# Patient Record
Sex: FEMALE | Race: BLACK OR AFRICAN AMERICAN | Employment: PART TIME | URBAN - METROPOLITAN AREA
[De-identification: names, ages, dates, MRNs, and addresses within clinical notes are randomized per-mention and may not be internally consistent; named-entity substitution may affect disease eponyms.]

---

## 2017-02-16 PROBLEM — S39.012A LUMBAR STRAIN: Status: ACTIVE | Noted: 2017-02-16

## 2017-05-10 PROBLEM — G47.01 INSOMNIA DUE TO MEDICAL CONDITION: Status: ACTIVE | Noted: 2017-05-10

## 2017-05-10 PROBLEM — M51.16 INTERVERTEBRAL DISC DISORDERS WITH RADICULOPATHY, LUMBAR REGION: Status: ACTIVE | Noted: 2017-05-10

## 2018-04-18 ENCOUNTER — APPOINTMENT (OUTPATIENT)
Dept: GENERAL RADIOLOGY | Age: 33
End: 2018-04-18
Payer: COMMERCIAL

## 2018-04-18 ENCOUNTER — HOSPITAL ENCOUNTER (EMERGENCY)
Age: 33
Discharge: HOME | End: 2018-04-18
Attending: EMERGENCY MEDICINE
Payer: COMMERCIAL

## 2018-04-18 VITALS
WEIGHT: 119 LBS | HEART RATE: 90 BPM | BODY MASS INDEX: 23.99 KG/M2 | HEIGHT: 59 IN | SYSTOLIC BLOOD PRESSURE: 118 MMHG | RESPIRATION RATE: 16 BRPM | DIASTOLIC BLOOD PRESSURE: 80 MMHG | OXYGEN SATURATION: 99 % | TEMPERATURE: 98.5 F

## 2018-04-18 DIAGNOSIS — S63.601A SPRAIN OF RIGHT THUMB, UNSPECIFIED SITE OF FINGER, INITIAL ENCOUNTER: Primary | ICD-10-CM

## 2018-04-18 PROCEDURE — 73130 X-RAY EXAM OF HAND: CPT

## 2018-04-18 PROCEDURE — 99283 EMERGENCY DEPT VISIT LOW MDM: CPT

## 2018-04-18 ASSESSMENT — PAIN DESCRIPTION - PAIN TYPE
TYPE: ACUTE PAIN
TYPE: ACUTE PAIN

## 2018-04-18 ASSESSMENT — PAIN DESCRIPTION - ORIENTATION
ORIENTATION: RIGHT
ORIENTATION: RIGHT

## 2018-04-18 ASSESSMENT — PAIN DESCRIPTION - LOCATION
LOCATION: FINGER (COMMENT WHICH ONE)
LOCATION: HAND

## 2018-04-18 ASSESSMENT — PAIN SCALES - GENERAL
PAINLEVEL_OUTOF10: 10
PAINLEVEL_OUTOF10: 8

## 2018-04-18 ASSESSMENT — PAIN DESCRIPTION - DESCRIPTORS
DESCRIPTORS: ACHING;SORE;THROBBING
DESCRIPTORS: CONSTANT

## 2018-04-18 ASSESSMENT — PAIN DESCRIPTION - PROGRESSION: CLINICAL_PROGRESSION: GRADUALLY IMPROVING

## 2018-05-30 ENCOUNTER — HOSPITAL ENCOUNTER (EMERGENCY)
Age: 33
Discharge: HOME | End: 2018-05-30
Attending: EMERGENCY MEDICINE
Payer: COMMERCIAL

## 2018-05-30 VITALS
DIASTOLIC BLOOD PRESSURE: 92 MMHG | RESPIRATION RATE: 16 BRPM | HEIGHT: 59 IN | WEIGHT: 120 LBS | SYSTOLIC BLOOD PRESSURE: 126 MMHG | BODY MASS INDEX: 24.19 KG/M2 | OXYGEN SATURATION: 100 % | HEART RATE: 87 BPM | TEMPERATURE: 98.7 F

## 2018-05-30 DIAGNOSIS — G89.29 ACUTE EXACERBATION OF CHRONIC LOW BACK PAIN: Primary | ICD-10-CM

## 2018-05-30 DIAGNOSIS — M54.50 ACUTE EXACERBATION OF CHRONIC LOW BACK PAIN: Primary | ICD-10-CM

## 2018-05-30 PROCEDURE — 96372 THER/PROPH/DIAG INJ SC/IM: CPT

## 2018-05-30 PROCEDURE — 6370000000 HC RX 637 (ALT 250 FOR IP): Performed by: EMERGENCY MEDICINE

## 2018-05-30 PROCEDURE — 99282 EMERGENCY DEPT VISIT SF MDM: CPT

## 2018-05-30 PROCEDURE — 6360000002 HC RX W HCPCS: Performed by: EMERGENCY MEDICINE

## 2018-05-30 RX ORDER — TRAMADOL HYDROCHLORIDE 50 MG/1
50 TABLET ORAL EVERY 6 HOURS PRN
COMMUNITY
End: 2018-07-16 | Stop reason: SDUPTHER

## 2018-05-30 RX ORDER — DIAZEPAM 5 MG/1
10 TABLET ORAL ONCE
Status: COMPLETED | OUTPATIENT
Start: 2018-05-30 | End: 2018-05-30

## 2018-05-30 RX ORDER — KETOROLAC TROMETHAMINE 30 MG/ML
60 INJECTION, SOLUTION INTRAMUSCULAR; INTRAVENOUS ONCE
Status: COMPLETED | OUTPATIENT
Start: 2018-05-30 | End: 2018-05-30

## 2018-05-30 RX ADMIN — KETOROLAC TROMETHAMINE 60 MG: 30 INJECTION, SOLUTION INTRAMUSCULAR at 21:44

## 2018-05-30 RX ADMIN — DIAZEPAM 10 MG: 5 TABLET ORAL at 21:44

## 2018-05-30 ASSESSMENT — PAIN SCALES - GENERAL
PAINLEVEL_OUTOF10: 6
PAINLEVEL_OUTOF10: 10
PAINLEVEL_OUTOF10: 10

## 2018-05-30 ASSESSMENT — PAIN DESCRIPTION - PROGRESSION: CLINICAL_PROGRESSION: NOT CHANGED

## 2018-05-30 ASSESSMENT — PAIN DESCRIPTION - PAIN TYPE: TYPE: ACUTE PAIN

## 2018-05-30 ASSESSMENT — PAIN DESCRIPTION - DESCRIPTORS: DESCRIPTORS: CONSTANT;RADIATING;SHOOTING

## 2018-05-30 ASSESSMENT — PAIN DESCRIPTION - ONSET: ONSET: ON-GOING

## 2018-05-30 ASSESSMENT — PAIN DESCRIPTION - ORIENTATION: ORIENTATION: LOWER

## 2018-05-30 ASSESSMENT — PAIN DESCRIPTION - LOCATION: LOCATION: BACK

## 2018-06-16 ENCOUNTER — HOSPITAL ENCOUNTER (EMERGENCY)
Age: 33
Discharge: HOME | End: 2018-06-16
Attending: EMERGENCY MEDICINE
Payer: COMMERCIAL

## 2018-06-16 ENCOUNTER — APPOINTMENT (OUTPATIENT)
Dept: CT IMAGING | Age: 33
End: 2018-06-16
Payer: COMMERCIAL

## 2018-06-16 VITALS
OXYGEN SATURATION: 97 % | TEMPERATURE: 97.8 F | HEIGHT: 59 IN | SYSTOLIC BLOOD PRESSURE: 118 MMHG | HEART RATE: 72 BPM | WEIGHT: 119 LBS | RESPIRATION RATE: 16 BRPM | BODY MASS INDEX: 23.99 KG/M2 | DIASTOLIC BLOOD PRESSURE: 79 MMHG

## 2018-06-16 DIAGNOSIS — G43.009 MIGRAINE WITHOUT AURA AND WITHOUT STATUS MIGRAINOSUS, NOT INTRACTABLE: Primary | ICD-10-CM

## 2018-06-16 PROCEDURE — 6360000002 HC RX W HCPCS: Performed by: EMERGENCY MEDICINE

## 2018-06-16 PROCEDURE — 96375 TX/PRO/DX INJ NEW DRUG ADDON: CPT

## 2018-06-16 PROCEDURE — 2580000003 HC RX 258: Performed by: EMERGENCY MEDICINE

## 2018-06-16 PROCEDURE — 70486 CT MAXILLOFACIAL W/O DYE: CPT

## 2018-06-16 PROCEDURE — 99284 EMERGENCY DEPT VISIT MOD MDM: CPT

## 2018-06-16 PROCEDURE — 70450 CT HEAD/BRAIN W/O DYE: CPT

## 2018-06-16 PROCEDURE — 96374 THER/PROPH/DIAG INJ IV PUSH: CPT

## 2018-06-16 RX ORDER — 0.9 % SODIUM CHLORIDE 0.9 %
1000 INTRAVENOUS SOLUTION INTRAVENOUS ONCE
Status: COMPLETED | OUTPATIENT
Start: 2018-06-16 | End: 2018-06-16

## 2018-06-16 RX ORDER — HYDROCODONE BITARTRATE AND ACETAMINOPHEN 5; 325 MG/1; MG/1
1 TABLET ORAL EVERY 6 HOURS PRN
Qty: 6 TABLET | Refills: 0 | Status: SHIPPED | OUTPATIENT
Start: 2018-06-16 | End: 2018-06-18

## 2018-06-16 RX ORDER — LORATADINE 10 MG/1
10 TABLET ORAL DAILY
Qty: 10 TABLET | Refills: 0 | Status: SHIPPED | OUTPATIENT
Start: 2018-06-16 | End: 2018-06-26

## 2018-06-16 RX ORDER — FLUTICASONE PROPIONATE 50 MCG
1 SPRAY, SUSPENSION (ML) NASAL DAILY
Qty: 1 BOTTLE | Refills: 0 | Status: SHIPPED | OUTPATIENT
Start: 2018-06-16 | End: 2019-09-22 | Stop reason: ALTCHOICE

## 2018-06-16 RX ORDER — DIPHENHYDRAMINE HYDROCHLORIDE 50 MG/ML
12.5 INJECTION INTRAMUSCULAR; INTRAVENOUS ONCE
Status: COMPLETED | OUTPATIENT
Start: 2018-06-16 | End: 2018-06-16

## 2018-06-16 RX ORDER — METOCLOPRAMIDE HYDROCHLORIDE 5 MG/ML
10 INJECTION INTRAMUSCULAR; INTRAVENOUS ONCE
Status: COMPLETED | OUTPATIENT
Start: 2018-06-16 | End: 2018-06-16

## 2018-06-16 RX ORDER — KETOROLAC TROMETHAMINE 30 MG/ML
15 INJECTION, SOLUTION INTRAMUSCULAR; INTRAVENOUS ONCE
Status: COMPLETED | OUTPATIENT
Start: 2018-06-16 | End: 2018-06-16

## 2018-06-16 RX ADMIN — SODIUM CHLORIDE 1000 ML: 9 INJECTION, SOLUTION INTRAVENOUS at 13:54

## 2018-06-16 RX ADMIN — KETOROLAC TROMETHAMINE 15 MG: 30 INJECTION, SOLUTION INTRAMUSCULAR at 13:54

## 2018-06-16 RX ADMIN — METOCLOPRAMIDE 10 MG: 5 INJECTION, SOLUTION INTRAMUSCULAR; INTRAVENOUS at 13:54

## 2018-06-16 RX ADMIN — DIPHENHYDRAMINE HYDROCHLORIDE 12.5 MG: 50 INJECTION, SOLUTION INTRAMUSCULAR; INTRAVENOUS at 13:54

## 2018-06-16 ASSESSMENT — PAIN SCALES - GENERAL
PAINLEVEL_OUTOF10: 9
PAINLEVEL_OUTOF10: 9

## 2018-06-16 ASSESSMENT — PAIN DESCRIPTION - LOCATION: LOCATION: HEAD

## 2018-06-16 ASSESSMENT — PAIN DESCRIPTION - PAIN TYPE: TYPE: ACUTE PAIN

## 2018-06-16 ASSESSMENT — PAIN DESCRIPTION - DESCRIPTORS: DESCRIPTORS: ACHING

## 2018-07-10 ENCOUNTER — HOSPITAL ENCOUNTER (OUTPATIENT)
Dept: ULTRASOUND IMAGING | Age: 33
Discharge: HOME | End: 2018-07-12
Payer: COMMERCIAL

## 2018-07-10 DIAGNOSIS — N80.129 ENDOMETRIOMA: ICD-10-CM

## 2018-07-10 PROCEDURE — 76856 US EXAM PELVIC COMPLETE: CPT

## 2018-07-10 PROCEDURE — 76830 TRANSVAGINAL US NON-OB: CPT

## 2018-07-17 ENCOUNTER — HOSPITAL ENCOUNTER (OUTPATIENT)
Age: 33
Discharge: HOME | End: 2018-07-19
Payer: COMMERCIAL

## 2018-07-17 LAB
ALCOHOL URINE: NOT DETECTED MG/DL
AMPHETAMINE SCREEN, URINE: NOT DETECTED
BARBITURATE SCREEN URINE: NOT DETECTED
BENZODIAZEPINE SCREEN, URINE: NOT DETECTED
CANNABINOID SCREEN URINE: NOT DETECTED
COCAINE METABOLITE SCREEN URINE: NOT DETECTED
METHADONE SCREEN, URINE: NOT DETECTED
OPIATE SCREEN URINE: NOT DETECTED
PHENCYCLIDINE SCREEN URINE: NOT DETECTED
PROPOXYPHENE SCREEN: NOT DETECTED

## 2018-07-17 PROCEDURE — G0480 DRUG TEST DEF 1-7 CLASSES: HCPCS

## 2018-07-17 PROCEDURE — 80307 DRUG TEST PRSMV CHEM ANLYZR: CPT

## 2018-07-21 LAB
6AM URINE: <10 NG/ML
CODEINE, URINE: <20 NG/ML
HYDROCODONE, URINE: <20 NG/ML
HYDROMORPHONE, URINE: <20 NG/ML
MORPHINE URINE: <20 NG/ML
NORHYDROCODONE, URINE: <20 NG/ML
NOROXYCODONE, URINE: <20 NG/ML
NOROXYMORPHONE, URINE: <20 NG/ML
O-DESMETHYLTRAMADOL,UR: 321 NG/ML
OXYCODONE, URINE CONFIRMATION: <20 NG/ML
OXYMORPHONE, URINE: <20 NG/ML
TRAMADOL, URINE: 724 NG/ML

## 2018-09-20 ENCOUNTER — HOSPITAL ENCOUNTER (EMERGENCY)
Age: 33
Discharge: HOME | End: 2018-09-20
Payer: COMMERCIAL

## 2018-09-20 VITALS
SYSTOLIC BLOOD PRESSURE: 142 MMHG | HEART RATE: 91 BPM | RESPIRATION RATE: 16 BRPM | DIASTOLIC BLOOD PRESSURE: 100 MMHG | OXYGEN SATURATION: 100 % | BODY MASS INDEX: 24.39 KG/M2 | TEMPERATURE: 97.9 F | WEIGHT: 121 LBS | HEIGHT: 59 IN

## 2018-09-20 DIAGNOSIS — S39.012A BACK STRAIN, INITIAL ENCOUNTER: Primary | ICD-10-CM

## 2018-09-20 DIAGNOSIS — J01.90 ACUTE NON-RECURRENT SINUSITIS, UNSPECIFIED LOCATION: ICD-10-CM

## 2018-09-20 PROCEDURE — 96372 THER/PROPH/DIAG INJ SC/IM: CPT

## 2018-09-20 PROCEDURE — 6360000002 HC RX W HCPCS: Performed by: PHYSICIAN ASSISTANT

## 2018-09-20 PROCEDURE — 99282 EMERGENCY DEPT VISIT SF MDM: CPT

## 2018-09-20 RX ORDER — AZITHROMYCIN 250 MG/1
TABLET, FILM COATED ORAL
Qty: 6 TABLET | Refills: 0 | Status: SHIPPED | OUTPATIENT
Start: 2018-09-20 | End: 2018-09-30

## 2018-09-20 RX ORDER — CYCLOBENZAPRINE HCL 10 MG
10 TABLET ORAL 3 TIMES DAILY PRN
Qty: 30 TABLET | Refills: 0 | Status: SHIPPED | OUTPATIENT
Start: 2018-09-20 | End: 2018-09-30

## 2018-09-20 RX ORDER — LIDOCAINE 50 MG/G
1 PATCH TOPICAL EVERY 24 HOURS
Qty: 10 PATCH | Refills: 0 | Status: SHIPPED | OUTPATIENT
Start: 2018-09-20 | End: 2018-09-30

## 2018-09-20 RX ORDER — OXYMETAZOLINE HYDROCHLORIDE 0.05 G/100ML
2 SPRAY NASAL 2 TIMES DAILY
Qty: 1 BOTTLE | Refills: 0 | Status: SHIPPED | OUTPATIENT
Start: 2018-09-20 | End: 2018-09-23

## 2018-09-20 RX ORDER — TRIAMCINOLONE ACETONIDE 40 MG/ML
40 INJECTION, SUSPENSION INTRA-ARTICULAR; INTRAMUSCULAR ONCE
Status: COMPLETED | OUTPATIENT
Start: 2018-09-20 | End: 2018-09-20

## 2018-09-20 RX ORDER — MORPHINE SULFATE 2 MG/ML
4 INJECTION, SOLUTION INTRAMUSCULAR; INTRAVENOUS ONCE
Status: COMPLETED | OUTPATIENT
Start: 2018-09-20 | End: 2018-09-20

## 2018-09-20 RX ADMIN — MORPHINE SULFATE 4 MG: 2 INJECTION, SOLUTION INTRAMUSCULAR; INTRAVENOUS at 17:09

## 2018-09-20 RX ADMIN — TRIAMCINOLONE ACETONIDE 40 MG: 40 INJECTION, SUSPENSION INTRA-ARTICULAR; INTRAMUSCULAR at 17:09

## 2018-09-20 ASSESSMENT — PAIN SCALES - GENERAL
PAINLEVEL_OUTOF10: 10
PAINLEVEL_OUTOF10: 10

## 2018-09-20 ASSESSMENT — PAIN DESCRIPTION - LOCATION: LOCATION: BACK

## 2018-09-20 ASSESSMENT — PAIN DESCRIPTION - ORIENTATION: ORIENTATION: LOWER;MID

## 2018-09-20 ASSESSMENT — PAIN DESCRIPTION - FREQUENCY: FREQUENCY: CONTINUOUS

## 2018-09-20 ASSESSMENT — PAIN DESCRIPTION - PAIN TYPE: TYPE: ACUTE PAIN

## 2018-09-20 ASSESSMENT — PAIN DESCRIPTION - DESCRIPTORS: DESCRIPTORS: SHARP;STABBING

## 2018-09-20 NOTE — ED PROVIDER NOTES
includes High Blood Pressure in her mother. Allergies: Aspirin and Nsaids    Physical Exam   VS:  BP (!) 142/100   Pulse 91   Temp 97.9 °F (36.6 °C) (Oral)   Resp 16   Ht 4' 11\" (1.499 m)   Wt 121 lb (54.9 kg)   LMP 05/11/2013   SpO2 100%   BMI 24.44 kg/m²      Oxygen Saturation Interpretation: Normal.    Constitutional:  Alert and oriented x4, development consistent with age, NAD  HEENT:  NC/NT. No bruising or lacerations, PERRLA, EOMI, sinus tenderness, Airway patent. Neck:  Normal ROM. Supple. No meningeal signs   Respiratory:  Clear to auscultation and breath sounds equal.  CV:  Regular rate and rhythm, without pathological murmurs, ectopy, gallops, or rubs. GI:  Abdomen soft, nontender, non-distended, No firm or pulsatile mass. Back: lower lumbar spine bilateral.             Tenderness: Moderate. Swelling: no.              Range of Motion: diminished range with pain. Skin:  no erythema, rash or swelling noted. Distal Function:              Motor deficit: none. Sensory deficit: none. Pulse deficit: none. Extremities: Full ROM x4,  No edema or tenderness   Straight leg raising: Positive bilaterally   Integument:  Normal turgor. Warm, dry, without visible rash. Neurological: CN II-XII grossly intact, Motor functions intact    Lab / Imaging Results   (All laboratory and radiology results have been personally reviewed by myself)  Labs:  No results found for this visit on 09/20/18. Imaging: All Radiology results interpreted by Radiologist unless otherwise noted. No orders to display     ED Course / Medical Decision Making     Medications   morphine (PF) injection 4 mg (4 mg Intramuscular Given 9/20/18 1709)   triamcinolone acetonide (KENALOG-40) injection 40 mg (40 mg Intramuscular Given 9/20/18 1709)     Re-examination:  Patients symptoms are improving after medication.  She is resting comfortably in the chair with no distress at this software. Every effort was made to ensure accuracy; however, inadvertent computerized transcription errors may be present.     END OF ED PROVIDER NOTE        Morales Hendrickson PA-C  09/20/18 0553

## 2018-11-26 ENCOUNTER — HOSPITAL ENCOUNTER (EMERGENCY)
Age: 33
Discharge: HOME | End: 2018-11-26
Payer: COMMERCIAL

## 2018-11-26 ENCOUNTER — APPOINTMENT (OUTPATIENT)
Dept: GENERAL RADIOLOGY | Age: 33
End: 2018-11-26
Payer: COMMERCIAL

## 2018-11-26 VITALS
OXYGEN SATURATION: 98 % | BODY MASS INDEX: 25 KG/M2 | WEIGHT: 124 LBS | TEMPERATURE: 98.8 F | HEART RATE: 99 BPM | SYSTOLIC BLOOD PRESSURE: 135 MMHG | HEIGHT: 59 IN | DIASTOLIC BLOOD PRESSURE: 88 MMHG | RESPIRATION RATE: 16 BRPM

## 2018-11-26 DIAGNOSIS — R05.9 COUGH: ICD-10-CM

## 2018-11-26 DIAGNOSIS — J02.9 ACUTE PHARYNGITIS, UNSPECIFIED ETIOLOGY: Primary | ICD-10-CM

## 2018-11-26 DIAGNOSIS — J40 BRONCHITIS: ICD-10-CM

## 2018-11-26 LAB — STREP GRP A PCR: NEGATIVE

## 2018-11-26 PROCEDURE — 87880 STREP A ASSAY W/OPTIC: CPT

## 2018-11-26 PROCEDURE — 71046 X-RAY EXAM CHEST 2 VIEWS: CPT

## 2018-11-26 PROCEDURE — 99283 EMERGENCY DEPT VISIT LOW MDM: CPT

## 2018-11-26 PROCEDURE — 6370000000 HC RX 637 (ALT 250 FOR IP): Performed by: PHYSICIAN ASSISTANT

## 2018-11-26 RX ORDER — CODEINE PHOSPHATE AND GUAIFENESIN 10; 100 MG/5ML; MG/5ML
10 SOLUTION ORAL ONCE
Status: COMPLETED | OUTPATIENT
Start: 2018-11-26 | End: 2018-11-26

## 2018-11-26 RX ORDER — METHYLPREDNISOLONE 4 MG/1
TABLET ORAL
Qty: 21 TABLET | Status: SHIPPED | OUTPATIENT
Start: 2018-11-26 | End: 2018-12-02

## 2018-11-26 RX ORDER — BENZONATATE 100 MG/1
100 CAPSULE ORAL 3 TIMES DAILY PRN
Qty: 21 CAPSULE | Refills: 0 | Status: SHIPPED | OUTPATIENT
Start: 2018-11-26 | End: 2018-12-03

## 2018-11-26 RX ADMIN — GUAIFENESIN AND CODEINE PHOSPHATE 10 ML: 10; 100 LIQUID ORAL at 15:39

## 2018-11-26 ASSESSMENT — PAIN SCALES - GENERAL: PAINLEVEL_OUTOF10: 10

## 2018-11-26 ASSESSMENT — PAIN DESCRIPTION - ORIENTATION: ORIENTATION: LEFT;RIGHT

## 2018-11-26 ASSESSMENT — PAIN DESCRIPTION - LOCATION: LOCATION: EAR;THROAT

## 2018-11-26 ASSESSMENT — PAIN DESCRIPTION - PAIN TYPE: TYPE: ACUTE PAIN

## 2018-11-26 ASSESSMENT — PAIN DESCRIPTION - DESCRIPTORS: DESCRIPTORS: SORE

## 2018-11-26 NOTE — ED PROVIDER NOTES
[facility]     Department of Emergency Medicine   ED  Provider Note  Admit Date/RoomTime: 11/26/2018  3:11 PM  ED Room: 03/03  Chief Complaint:   Pharyngitis (4 days, ears hurt, headache, no fever)    History of Present Illness   Source of history provided by:  patient. History/Exam Limitations: none. Amy Vicente is a 26 y.o. old female with a past medical history of:   Past Medical History:   Diagnosis Date    Abnormal Pap smear     Back pain     Bronchitis     Chronic back pain     Endometriosis     H/O colposcopy with cervical biopsy     Headache     Hypertension     Placenta previa     Current pregnancy    Pregnancy induced hypertension     Thyroid disease     warm nodule & goiter    presents to the emergency department by private vehicle, for cough, sinus pressure, sore throat, which began 3 days prior to arrival.  Since onset the symptoms have been persistent and mild in severity. The symptoms are associated with nonproductive cough, nasal congestion, sore throat, chills. There has been NO Documented fever, nausea, vomiting, abdominal pain, pain with range of motion of neck. Patient's children are both sick. Patient states she came in contact with people that had mono the other day. Patient does not play any sports. ROS   Pertinent positives and negatives are stated within HPI, all other systems reviewed and are negative. Past Surgical History:  has a past surgical history that includes Ovary removal; [place] tooth extraction; Tubal ligation (2013); Hysterectomy (2013); and hernia repair. Social History:  reports that she quit smoking about 2 years ago. Her smoking use included Cigarettes. She smoked 0.00 packs per day. She has never used smokeless tobacco. She reports that she does not drink alcohol or use drugs. Family History: family history includes High Blood Pressure in her mother.    Allergies: Aspirin and Nsaids    Physical Exam           ED Triage Vitals [11/26/18 0196]

## 2019-06-20 ENCOUNTER — HOSPITAL ENCOUNTER (EMERGENCY)
Age: 34
Discharge: HOME | End: 2019-06-20
Payer: COMMERCIAL

## 2019-06-20 VITALS
OXYGEN SATURATION: 98 % | SYSTOLIC BLOOD PRESSURE: 168 MMHG | TEMPERATURE: 98.9 F | DIASTOLIC BLOOD PRESSURE: 109 MMHG | HEART RATE: 78 BPM | BODY MASS INDEX: 27.47 KG/M2 | WEIGHT: 136 LBS | RESPIRATION RATE: 16 BRPM

## 2019-06-20 DIAGNOSIS — H66.002 NON-RECURRENT ACUTE SUPPURATIVE OTITIS MEDIA OF LEFT EAR WITHOUT SPONTANEOUS RUPTURE OF TYMPANIC MEMBRANE: Primary | ICD-10-CM

## 2019-06-20 DIAGNOSIS — J06.9 UPPER RESPIRATORY TRACT INFECTION, UNSPECIFIED TYPE: ICD-10-CM

## 2019-06-20 LAB
MONO TEST: NEGATIVE
STREP GRP A PCR: NEGATIVE

## 2019-06-20 PROCEDURE — 86308 HETEROPHILE ANTIBODY SCREEN: CPT

## 2019-06-20 PROCEDURE — 99282 EMERGENCY DEPT VISIT SF MDM: CPT

## 2019-06-20 PROCEDURE — 87880 STREP A ASSAY W/OPTIC: CPT

## 2019-06-20 RX ORDER — AMOXICILLIN 500 MG/1
500 CAPSULE ORAL 3 TIMES DAILY
Qty: 30 CAPSULE | Refills: 0 | Status: SHIPPED | OUTPATIENT
Start: 2019-06-20 | End: 2019-06-30

## 2019-06-20 ASSESSMENT — PAIN DESCRIPTION - PAIN TYPE: TYPE: ACUTE PAIN

## 2019-06-20 ASSESSMENT — PAIN DESCRIPTION - DESCRIPTORS: DESCRIPTORS: SORE

## 2019-06-20 ASSESSMENT — PAIN SCALES - GENERAL: PAINLEVEL_OUTOF10: 7

## 2019-06-20 ASSESSMENT — PAIN DESCRIPTION - LOCATION: LOCATION: THROAT

## 2019-06-20 NOTE — ED PROVIDER NOTES
[facility]        HPI:  6/20/19,   Time: 11:30 AM         Heather Man is a 37 y.o. female presenting to the ED for pain in throat, left ear pain and fatigue, beginning 3 days ago. The complaint has been persistent, moderate in severity, and worsened by nothing. The patient states she has not felt well for about a week. She states she is just been a little achy and tired. She had some diarrhea and nausea but no vomiting. Denies any abdominal pain. She states that she then started having some pain and discomfort the front of her neck and in her left ear. She states that it hurts when she swallows as well. She has had no fever or chills        ROS:     Constitutional:  See HPI  HENT: See HPI  Eyes: Negative for pain, discharge and redness  Respiratory:  Negative for shortness of breath, cough and wheezing  Cardiovascular: Negative for CP, edema or palpitations  Gastrointestinal: See HPI  Genitourinary: Negative for dysuria and frequency  Musculoskeletal: Negative for back pain and arthralgia  Skin: Negative for rash and wound  Neurological: Negative for weakness and headaches  Hematological: Negative for adenopathy    All other systems reviewed and are negative      -------------------------------- PAST HISTORY ----------------------------------  Past Medical History:  has a past medical history of Abnormal Pap smear, Back pain, Bronchitis, Chronic back pain, Endometriosis, H/O colposcopy with cervical biopsy, Headache, Hypertension, Placenta previa, Pregnancy induced hypertension, and Thyroid disease. Past Surgical History:  has a past surgical history that includes Ovary removal; [place] tooth extraction; Tubal ligation (2013); Hysterectomy (2013); and hernia repair. Social History:  reports that she quit smoking about 3 years ago. Her smoking use included cigarettes. She smoked 0.00 packs per day.  She has never used smokeless tobacco. She reports that she does not drink alcohol or use drugs. Family History: family history includes High Blood Pressure in her mother. The patients home medications have been reviewed. Allergies: Aspirin and Nsaids    --------------------------------- RESULTS ------------------------------------------  All laboratory and radiology results have been personally reviewed by myself   LABS:  Results for orders placed or performed during the hospital encounter of 06/20/19   Strep Screen Group A Throat   Result Value Ref Range    Strep Grp A PCR Negative Negative   Mononucleosis Screen   Result Value Ref Range    Mono Test Negative Negative       RADIOLOGY:  Interpreted by Radiologist.  No orders to display       ----------------- NURSING NOTES AND VITALS REVIEWED ---------------   The nursing notes within the ED encounter and vital signs as below have been reviewed. BP (!) 168/109   Pulse 78   Temp 98.9 °F (37.2 °C)   Resp 16   Wt 136 lb (61.7 kg)   LMP 05/11/2013   SpO2 98%   BMI 27.47 kg/m²   Oxygen Saturation Interpretation: Normal      --------------------------------PHYSICAL EXAM------------------------------------      Constitutional/General: Alert and oriented x3, well appearing, non toxic in NAD  Head: NC/AT  Eyes: PERRL, EOMI  TM's intact bilaterally. Left TM bulging. Tender left anterior cervical nodes. Mouth: Oropharynx clear, handling secretions, no trismus  Neck: Supple, full ROM, no meningeal signs  Pulmonary: Lungs clear to auscultation bilaterally, no wheezes, rales, or rhonchi. Not in respiratory distress  Cardiovascular:  Regular rate and rhythm, no murmurs, gallops, or rubs. 2+ distal pulses  Abdomen: Soft, + BS. No distension. Nontender. No palpable rigidity, rebound or guarding  Extremities: Moves all extremities x 4. Warm and well perfused  Skin: warm and dry without rash  Neurologic: GCS 15,  Intact.   No focal deficits  Psych: Normal Affect      ------------------------ ED COURSE/MEDICAL DECISION MAKING----------------------  Medications - No data to display      Medical Decision Making:    Rapid strep and mono negative. She does hae fluid in left ear with swollen lymph node   She does have nodule on her thyroid that is being worked up outpatient. Advised to call PCP for follow-up. Tylenol PRN       Counseling: The emergency provider has spoken with the patient and discussed todays results, in addition to providing specific details for the plan of care and counseling regarding the diagnosis and prognosis. Questions are answered at this time and they are agreeable with the plan.      ------------------------ IMPRESSION AND DISPOSITION -------------------------------    IMPRESSION  No diagnosis found.     DISPOSITION  Disposition: Discharge to home  Patient condition is stable                   Kadie Etienne PA-C  06/20/19 5811

## 2019-09-22 ENCOUNTER — HOSPITAL ENCOUNTER (EMERGENCY)
Age: 34
Discharge: HOME | End: 2019-09-22
Attending: EMERGENCY MEDICINE
Payer: COMMERCIAL

## 2019-09-22 ENCOUNTER — APPOINTMENT (OUTPATIENT)
Dept: GENERAL RADIOLOGY | Age: 34
End: 2019-09-22
Payer: COMMERCIAL

## 2019-09-22 VITALS
WEIGHT: 145 LBS | OXYGEN SATURATION: 98 % | HEIGHT: 59 IN | TEMPERATURE: 99.1 F | HEART RATE: 90 BPM | BODY MASS INDEX: 29.23 KG/M2 | DIASTOLIC BLOOD PRESSURE: 88 MMHG | SYSTOLIC BLOOD PRESSURE: 128 MMHG | RESPIRATION RATE: 16 BRPM

## 2019-09-22 DIAGNOSIS — J01.00 ACUTE MAXILLARY SINUSITIS, RECURRENCE NOT SPECIFIED: Primary | ICD-10-CM

## 2019-09-22 PROCEDURE — 96372 THER/PROPH/DIAG INJ SC/IM: CPT

## 2019-09-22 PROCEDURE — 6360000002 HC RX W HCPCS: Performed by: EMERGENCY MEDICINE

## 2019-09-22 PROCEDURE — 99283 EMERGENCY DEPT VISIT LOW MDM: CPT

## 2019-09-22 PROCEDURE — 71046 X-RAY EXAM CHEST 2 VIEWS: CPT

## 2019-09-22 PROCEDURE — 6370000000 HC RX 637 (ALT 250 FOR IP): Performed by: EMERGENCY MEDICINE

## 2019-09-22 RX ORDER — METOPROLOL SUCCINATE 25 MG/1
25 TABLET, EXTENDED RELEASE ORAL DAILY
COMMUNITY

## 2019-09-22 RX ORDER — DOXYCYCLINE HYCLATE 100 MG
100 TABLET ORAL 2 TIMES DAILY
Qty: 20 TABLET | Refills: 0 | Status: SHIPPED | OUTPATIENT
Start: 2019-09-22 | End: 2019-10-02

## 2019-09-22 RX ORDER — PREDNISONE 10 MG/1
40 TABLET ORAL DAILY
Qty: 20 TABLET | Refills: 0 | Status: SHIPPED | OUTPATIENT
Start: 2019-09-22 | End: 2019-09-27

## 2019-09-22 RX ORDER — GUAIFENESIN/DEXTROMETHORPHAN 100-10MG/5
10 SYRUP ORAL ONCE
Status: COMPLETED | OUTPATIENT
Start: 2019-09-22 | End: 2019-09-22

## 2019-09-22 RX ORDER — ALBUTEROL SULFATE 90 UG/1
2 AEROSOL, METERED RESPIRATORY (INHALATION) EVERY 4 HOURS PRN
Qty: 1 INHALER | Status: SHIPPED | OUTPATIENT
Start: 2019-09-22 | End: 2020-10-08

## 2019-09-22 RX ORDER — ACETAMINOPHEN 500 MG
1000 TABLET ORAL ONCE
Status: COMPLETED | OUTPATIENT
Start: 2019-09-22 | End: 2019-09-22

## 2019-09-22 RX ORDER — LORATADINE AND PSEUDOEPHEDRINE SULFATE 5; 120 MG/1; MG/1
1 TABLET, EXTENDED RELEASE ORAL 2 TIMES DAILY
Qty: 20 TABLET | Refills: 0 | Status: SHIPPED | OUTPATIENT
Start: 2019-09-22 | End: 2020-10-08

## 2019-09-22 RX ORDER — CEFTRIAXONE 1 G/1
1 INJECTION, POWDER, FOR SOLUTION INTRAMUSCULAR; INTRAVENOUS ONCE
Status: COMPLETED | OUTPATIENT
Start: 2019-09-22 | End: 2019-09-22

## 2019-09-22 RX ADMIN — ACETAMINOPHEN 1000 MG: 500 TABLET ORAL at 03:57

## 2019-09-22 RX ADMIN — CEFTRIAXONE SODIUM 1 G: 1 INJECTION, POWDER, FOR SOLUTION INTRAMUSCULAR; INTRAVENOUS at 03:56

## 2019-09-22 RX ADMIN — GUAIFENESIN AND DEXTROMETHORPHAN 10 ML: 100; 10 SYRUP ORAL at 03:57

## 2019-09-22 ASSESSMENT — PAIN DESCRIPTION - ONSET: ONSET: ON-GOING

## 2019-09-22 ASSESSMENT — PAIN DESCRIPTION - PAIN TYPE: TYPE: ACUTE PAIN

## 2019-09-22 ASSESSMENT — PAIN DESCRIPTION - FREQUENCY: FREQUENCY: CONTINUOUS

## 2019-09-22 ASSESSMENT — PAIN DESCRIPTION - LOCATION: LOCATION: GENERALIZED

## 2019-09-22 ASSESSMENT — PAIN DESCRIPTION - PROGRESSION: CLINICAL_PROGRESSION: GRADUALLY WORSENING

## 2019-09-22 ASSESSMENT — PAIN DESCRIPTION - DESCRIPTORS: DESCRIPTORS: ACHING

## 2019-09-22 ASSESSMENT — PAIN SCALES - GENERAL
PAINLEVEL_OUTOF10: 5
PAINLEVEL_OUTOF10: 5

## 2019-12-23 ENCOUNTER — HOSPITAL ENCOUNTER (EMERGENCY)
Age: 34
Discharge: HOME | End: 2019-12-23
Attending: EMERGENCY MEDICINE
Payer: COMMERCIAL

## 2019-12-23 VITALS
HEIGHT: 59 IN | OXYGEN SATURATION: 99 % | TEMPERATURE: 98.5 F | DIASTOLIC BLOOD PRESSURE: 78 MMHG | BODY MASS INDEX: 29.84 KG/M2 | WEIGHT: 148 LBS | SYSTOLIC BLOOD PRESSURE: 124 MMHG | RESPIRATION RATE: 16 BRPM | HEART RATE: 84 BPM

## 2019-12-23 DIAGNOSIS — S39.012A BACK STRAIN, INITIAL ENCOUNTER: Primary | ICD-10-CM

## 2019-12-23 PROCEDURE — 99282 EMERGENCY DEPT VISIT SF MDM: CPT

## 2019-12-23 PROCEDURE — 6360000002 HC RX W HCPCS: Performed by: EMERGENCY MEDICINE

## 2019-12-23 PROCEDURE — 96372 THER/PROPH/DIAG INJ SC/IM: CPT

## 2019-12-23 RX ORDER — ORPHENADRINE CITRATE 30 MG/ML
60 INJECTION INTRAMUSCULAR; INTRAVENOUS ONCE
Status: COMPLETED | OUTPATIENT
Start: 2019-12-23 | End: 2019-12-23

## 2019-12-23 RX ORDER — KETOROLAC TROMETHAMINE 10 MG/1
10 TABLET, FILM COATED ORAL EVERY 6 HOURS PRN
Qty: 20 TABLET | Refills: 0 | Status: SHIPPED | OUTPATIENT
Start: 2019-12-23 | End: 2020-10-08

## 2019-12-23 RX ORDER — CYCLOBENZAPRINE HCL 10 MG
10 TABLET ORAL 3 TIMES DAILY PRN
Qty: 15 TABLET | Refills: 0 | Status: SHIPPED | OUTPATIENT
Start: 2019-12-23 | End: 2019-12-28

## 2019-12-23 RX ORDER — KETOROLAC TROMETHAMINE 30 MG/ML
30 INJECTION, SOLUTION INTRAMUSCULAR; INTRAVENOUS ONCE
Status: COMPLETED | OUTPATIENT
Start: 2019-12-23 | End: 2019-12-23

## 2019-12-23 RX ADMIN — KETOROLAC TROMETHAMINE 30 MG: 30 INJECTION, SOLUTION INTRAMUSCULAR; INTRAVENOUS at 15:18

## 2019-12-23 RX ADMIN — ORPHENADRINE CITRATE 60 MG: 30 INJECTION INTRAMUSCULAR; INTRAVENOUS at 15:18

## 2019-12-23 ASSESSMENT — PAIN DESCRIPTION - ORIENTATION: ORIENTATION: RIGHT

## 2019-12-23 ASSESSMENT — PAIN DESCRIPTION - PAIN TYPE: TYPE: ACUTE PAIN

## 2019-12-23 ASSESSMENT — PAIN DESCRIPTION - FREQUENCY: FREQUENCY: CONTINUOUS

## 2019-12-23 ASSESSMENT — PAIN DESCRIPTION - LOCATION: LOCATION: BACK

## 2019-12-23 ASSESSMENT — PAIN DESCRIPTION - PROGRESSION: CLINICAL_PROGRESSION: GRADUALLY WORSENING

## 2019-12-23 ASSESSMENT — PAIN DESCRIPTION - DESCRIPTORS: DESCRIPTORS: SHARP

## 2019-12-23 ASSESSMENT — PAIN - FUNCTIONAL ASSESSMENT: PAIN_FUNCTIONAL_ASSESSMENT: PREVENTS OR INTERFERES SOME ACTIVE ACTIVITIES AND ADLS

## 2019-12-23 ASSESSMENT — PAIN SCALES - GENERAL: PAINLEVEL_OUTOF10: 10

## 2020-02-29 ENCOUNTER — HOSPITAL ENCOUNTER (EMERGENCY)
Age: 35
Discharge: HOME | End: 2020-02-29
Attending: EMERGENCY MEDICINE
Payer: COMMERCIAL

## 2020-02-29 VITALS
DIASTOLIC BLOOD PRESSURE: 90 MMHG | SYSTOLIC BLOOD PRESSURE: 168 MMHG | RESPIRATION RATE: 16 BRPM | WEIGHT: 152 LBS | HEIGHT: 59 IN | BODY MASS INDEX: 30.64 KG/M2 | TEMPERATURE: 97.4 F | HEART RATE: 72 BPM | OXYGEN SATURATION: 98 %

## 2020-02-29 PROCEDURE — 96372 THER/PROPH/DIAG INJ SC/IM: CPT

## 2020-02-29 PROCEDURE — 6360000002 HC RX W HCPCS: Performed by: EMERGENCY MEDICINE

## 2020-02-29 PROCEDURE — 99283 EMERGENCY DEPT VISIT LOW MDM: CPT

## 2020-02-29 RX ORDER — BROMPHENIRAMINE MALEATE, PSEUDOEPHEDRINE HYDROCHLORIDE, AND DEXTROMETHORPHAN HYDROBROMIDE 2; 30; 10 MG/5ML; MG/5ML; MG/5ML
5 SYRUP ORAL 4 TIMES DAILY PRN
Qty: 1 BOTTLE | Refills: 0 | Status: SHIPPED | OUTPATIENT
Start: 2020-02-29 | End: 2020-10-08

## 2020-02-29 RX ORDER — KETOROLAC TROMETHAMINE 30 MG/ML
30 INJECTION, SOLUTION INTRAMUSCULAR; INTRAVENOUS ONCE
Status: COMPLETED | OUTPATIENT
Start: 2020-02-29 | End: 2020-02-29

## 2020-02-29 RX ORDER — AMOXICILLIN 500 MG/1
500 CAPSULE ORAL 3 TIMES DAILY
Qty: 15 CAPSULE | Refills: 0 | Status: SHIPPED | OUTPATIENT
Start: 2020-02-29 | End: 2020-03-05

## 2020-02-29 RX ADMIN — KETOROLAC TROMETHAMINE 30 MG: 30 INJECTION, SOLUTION INTRAMUSCULAR; INTRAVENOUS at 10:43

## 2020-02-29 ASSESSMENT — PAIN SCALES - GENERAL
PAINLEVEL_OUTOF10: 10
PAINLEVEL_OUTOF10: 3
PAINLEVEL_OUTOF10: 10

## 2020-02-29 ASSESSMENT — PAIN DESCRIPTION - DESCRIPTORS
DESCRIPTORS: ACHING
DESCRIPTORS: ACHING

## 2020-02-29 ASSESSMENT — PAIN DESCRIPTION - LOCATION
LOCATION: HEAD
LOCATION: HEAD

## 2020-02-29 ASSESSMENT — PAIN DESCRIPTION - PAIN TYPE: TYPE: ACUTE PAIN

## 2020-02-29 ASSESSMENT — PAIN DESCRIPTION - FREQUENCY
FREQUENCY: INTERMITTENT
FREQUENCY: CONTINUOUS

## 2020-02-29 NOTE — ED PROVIDER NOTES
Department of Emergency Medicine   ED  Provider Note  Admit Date/RoomTime: 2/29/2020 10:18 AM  ED Room: 02/02 2/29/20  10:37 AM      HPI: Pamela Sunshine is a 37 y.o. female with a past medical history of  has a past medical history of Abnormal Pap smear, Back pain, Bronchitis, Chronic back pain, Endometriosis, H/O colposcopy with cervical biopsy, Headache, Hypertension, Placenta previa, Pregnancy induced hypertension, and Thyroid disease. presenting with complaints of a cough with nasal congestion and headache. The patient states that these symptoms began gradually over the past week. The history is obtained from the patient. Patient does complain of a mild cough associated with it that is nonproductive. Patient denies excessive fatigue or sleeping greater than 18 hours a day. Patient denies exposure to mononucleosis. The patient denies any abdominal pain, left upper quadrant fullness, or early satiety. The patient also denies difficulty breathing, hemoptysis, neck pain/stiffness, or blurry vision, or chest pain. Sx have persisted and are mildly worse which is what prompted the visit today. Negative exposure to sick contacts. Patient gets frequent sinus infections and she feels that she has one again. She complains of pressure in her sinuses as well as congestion and cough. No fevers or chills. No neck pain. No vision changes. No focal neurologic deficits. Review of Systems:   Pertinent positives and negatives are stated within HPI, all other systems reviewed and are negative.           --------------------------------------------- PAST HISTORY ---------------------------------------------  Past Medical History:  has a past medical history of Abnormal Pap smear, Back pain, Bronchitis, Chronic back pain, Endometriosis, H/O colposcopy with cervical biopsy, Headache, Hypertension, Placenta previa, Pregnancy induced hypertension, and Thyroid disease.     Past Surgical History:  has a past surgical history that includes Ovary removal; [place] tooth extraction; Tubal ligation (2013); Hysterectomy (2013); and hernia repair. Social History:  reports that she quit smoking about 4 years ago. Her smoking use included cigarettes. She smoked 0.00 packs per day. She has never used smokeless tobacco. She reports that she does not drink alcohol or use drugs. Family History: family history includes High Blood Pressure in her mother. The patients home medications have been reviewed. Allergies: Aspirin and Nsaids          Physical exam:  Constitutional: Vital signs are reviewed the patient is comfortable. The patient is alert and oriented and conversant. Head: The head is atraumatic and normocephalic. Eyes: No discharge is present from the eyes. The sclera are normal.  Ears: TMs bilaterally demonstrate no erythema, no bulging and no evidence of infection. Mouth: The oropharynx demonstrates a small amount of erythema bilaterally. There is no tonsillar enlargement nor is there any exudate present. No uvular deviation or edema. No tonsillary asymmetry. Floor of the mouth soft, no trismus, handling secretions. Neck: Normal range of motion is achieved in the neck. There is no JVD present. No meningeal signs are present   Anterior cervical adenopathy is present. Respiratory: The chest is nontender. Breath sounds are clear to auscultation bilaterally. No wheezes, rales, or rhonchi. There is no respiratory distress. Cardiovascular: Heart shows a regular rate and rhythm without murmurs, rubs, clicks or gallops. Abdominal exam: The abdomen is non tender without evidence of peritoneal signs.  Specific attention to the left upper quadrant with palpation of the spleen demonstrates no organomegaly or tenderness  Skin: warm and dry, without rash  Neurologic: GCS 15, muscle strength and sensation intact in bilateral upper and lower extremities    -------------------------------------------------- RESULTS -------------------------------------------------    LABS:  No results found for this visit on 02/29/20. RADIOLOGY:  Interpreted by Radiologist.  No orders to display             ------------------------- NURSING NOTES AND VITALS REVIEWED ---------------------------   The nursing notes within the ED encounter and vital signs as below have been reviewed. BP (!) 139/104 Comment:  Right side 161/101  Pulse 72   Temp 97.4 °F (36.3 °C) (Temporal)   Resp 16   Ht 4' 11\" (1.499 m)   Wt 152 lb (68.9 kg)   LMP 05/11/2013   SpO2 98%   BMI 30.70 kg/m²   Oxygen Saturation Interpretation: Normal    The patients available past medical records and past encounters were reviewed. ------------------------------ ED COURSE/MEDICAL DECISION MAKING----------------------  Medications   ketorolac (TORADOL) injection 30 mg (has no administration in time range)             Medical Decision Making:        Likely bacterial sinusitis given the duration of symptoms and sinus pressure with sinus congestion and mild cough. No nuchal rigidity or meningismus. No focal neurologic deficits. No recent head injuries or trauma. Patient prescribed amoxicillin as well as Bromfed DM and she is instructed to continue to monitor her symptoms. Explained the warning signs and symptoms for which she should immediately return the emergency department. Patient voices understanding and is agreeable with this plan. She has a listed allergy to NSAIDs but tolerates Toradol and will be given a dose here in the emergency department. She appears nontoxic and is stable for discharge. Plan is for symptom management and PCP follow up. Counseling: The emergency provider has spoken with the patient and discussed todays results, in addition to providing specific details for the plan of care and counseling regarding the diagnosis and prognosis. Questions are answered at this time and they are agreeable with the plan.

## 2020-06-11 ENCOUNTER — VIRTUAL VISIT (OUTPATIENT)
Dept: ENDOCRINOLOGY | Age: 35
End: 2020-06-11
Payer: COMMERCIAL

## 2020-06-11 PROCEDURE — G8417 CALC BMI ABV UP PARAM F/U: HCPCS | Performed by: INTERNAL MEDICINE

## 2020-06-11 PROCEDURE — G8427 DOCREV CUR MEDS BY ELIG CLIN: HCPCS | Performed by: INTERNAL MEDICINE

## 2020-06-11 PROCEDURE — 1036F TOBACCO NON-USER: CPT | Performed by: INTERNAL MEDICINE

## 2020-06-11 PROCEDURE — 99204 OFFICE O/P NEW MOD 45 MIN: CPT | Performed by: INTERNAL MEDICINE

## 2020-06-11 NOTE — PROGRESS NOTES
[address] [place] [address] Department of Endocrinology Diabetes and Metabolism   [address] [facility] [address]   Phone: [phone]  Fax: [phone]    Date of Service: 6/11/2020    Primary Care Physician: Addison Harman MD  Provider: Celine Gore MD    Reason for the visit:  Hyperthyroidism, MNG     History of Present Illness: The history is provided by the patient. No  was used. Accuracy of the patient data is excellent. Eneida Hernandez is a very pleasant 38 y.o. female seen today for evaluation and management of hyperthyroidism     The patient told me that she was diagnosed with hyperthyroidism about 2 years ago but never required treatment   She has thyroid US and [place] thyroid Uptake&scan in 2018 (result not available today)     2018 US showed thyroid nodule     Per pt no clear etiology identified for hyperthyroidism     Last Labs 3/2020 - TSH mildly suppressed at 0.33, free T4 normal 1.26    Patient reported feeling tired, Weight gain but denied palpitations, swelling in the area of the thyroid gland, tremor, sweating, heat intolerance, changes in bowel habits, or muscle weakness    PAST MEDICAL HISTORY   Past Medical History:   Diagnosis Date    Abnormal Pap smear     Back pain     Bronchitis     Chronic back pain     Endometriosis     H/O colposcopy with cervical biopsy     Headache     Hypertension     Placenta previa     Current pregnancy    Pregnancy induced hypertension     Thyroid disease     warm nodule & goiter       PAST SURGICAL HISTORY   Past Surgical History:   Procedure Laterality Date    HERNIA REPAIR      HYSTERECTOMY  2013    OVARY REMOVAL      TUBAL LIGATION  2013    WISDOM TOOTH EXTRACTION         SOCIAL HISTORY   Tobacco:   reports that she quit smoking about 4 years ago. Her smoking use included cigarettes. She smoked 0.00 packs per day. She has never used smokeless tobacco.  Alcohol:   reports no history of alcohol use.   Drugs: numbess, no tingling, no weakness,     OBJECTIVE    LMP 05/11/2013   BP Readings from Last 4 Encounters:   02/29/20 (!) 168/90   12/23/19 124/78   09/22/19 128/88   06/20/19 (!) 168/109     Wt Readings from Last 6 Encounters:   02/29/20 152 lb (68.9 kg)   12/23/19 148 lb (67.1 kg)   09/22/19 145 lb (65.8 kg)   06/20/19 136 lb (61.7 kg)   11/26/18 124 lb (56.2 kg)   09/21/18 127 lb (57.6 kg)     Physical examination:  Due to this being a TeleHealth encounter, evaluation of the following organ systems is limited: Vitals/Constitutional/EENT/Resp/CV/GI//MS/Neuro/Skin/Heme-Lymph-Imm. Modified physical exam through Telemedicine camera    General: Communicating well via camera   Neck: no obvious neck mass. No obvious neck deformity     CVS: no distress   Chest: no distress. Chest is moving with respiration    Extremities:  no visible tremor  Skin: No visible rashes as seen from camera   Musculoskeletal: no visible deformity  Neuro: Alert and oriented to person, place, and time. Psychiatric: Normal mood and affect.  Behavior is normal    Review of Laboratory Data:  I have reviewed the following:  Lab Results   Component Value Date/Time    WBC 7.9 02/15/2017 05:30 PM    RBC 5.17 02/15/2017 05:30 PM    HGB 16.2 (H) 02/15/2017 05:30 PM    HCT 47.9 02/15/2017 05:30 PM    MCV 92.6 02/15/2017 05:30 PM    MCH 31.3 02/15/2017 05:30 PM    MCHC 33.8 02/15/2017 05:30 PM    RDW 12.0 02/15/2017 05:30 PM     02/15/2017 05:30 PM    MPV 8.8 02/15/2017 05:30 PM      Lab Results   Component Value Date/Time     02/15/2017 05:30 PM    K 3.8 02/15/2017 05:30 PM    CO2 30 (H) 02/15/2017 05:30 PM    BUN 15 02/15/2017 05:30 PM    CREATININE 0.8 02/15/2017 05:30 PM    CALCIUM 9.9 02/15/2017 05:30 PM    LABGLOM >60 02/15/2017 05:30 PM    GFRAA >60 02/15/2017 05:30 PM      No results found for: TSH, T4FREE, U9DJNXX, FT3, F8VTVAX, TSI, TPOABS, THGAB  Lab Results   Component Value Date    GLUCOSE 102 02/15/2017    GLUCOSE 73 [facility], [address]350 waiver authority and the [employer] and [employer] Act, this Virtual  Visit was conducted, with patient's consent, to reduce the patient's risk of exposure to COVID-19 and provide continuity of care.

## 2020-06-11 NOTE — PROGRESS NOTES
Mari Byrd was read the following message We want to confirm that, for purposes of billing, this is a virtual visit with your provider for which we will submit a claim for reimbursement with your insurance company. You will be responsible for any copays, coinsurance amounts or other amounts not covered by your insurance company. If you do not accept this, unfortunately we will not be able to schedule a virtual visit with the provider. Do you accept?  Aury Arechiga responded YES

## 2020-06-18 ENCOUNTER — HOSPITAL ENCOUNTER (OUTPATIENT)
Dept: ULTRASOUND IMAGING | Age: 35
Discharge: HOME | End: 2020-06-20
Payer: COMMERCIAL

## 2020-06-18 ENCOUNTER — HOSPITAL ENCOUNTER (OUTPATIENT)
Age: 35
Discharge: HOME | End: 2020-06-18
Payer: COMMERCIAL

## 2020-06-18 LAB
ANION GAP SERPL CALCULATED.3IONS-SCNC: 16 MMOL/L (ref 7–16)
BUN BLDV-MCNC: 15 MG/DL (ref 6–20)
CALCIUM SERPL-MCNC: 10.7 MG/DL (ref 8.6–10.2)
CHLORIDE BLD-SCNC: 100 MMOL/L (ref 98–107)
CO2: 24 MMOL/L (ref 22–29)
CREAT SERPL-MCNC: 0.8 MG/DL (ref 0.5–1)
GFR AFRICAN AMERICAN: >60
GFR NON-AFRICAN AMERICAN: >60 ML/MIN/1.73
GLUCOSE BLD-MCNC: 94 MG/DL (ref 74–99)
POTASSIUM SERPL-SCNC: 4.1 MMOL/L (ref 3.5–5)
SODIUM BLD-SCNC: 140 MMOL/L (ref 132–146)
T3 TOTAL: 111.9 NG/DL (ref 80–200)
T4 FREE: 1.47 NG/DL (ref 0.93–1.7)
T4 TOTAL: 8.6 MCG/DL (ref 4.5–11.7)
TSH SERPL DL<=0.05 MIU/L-ACNC: 0.43 UIU/ML (ref 0.27–4.2)
VITAMIN D 25-HYDROXY: 26 NG/ML (ref 30–100)

## 2020-06-18 PROCEDURE — 76536 US EXAM OF HEAD AND NECK: CPT

## 2020-06-21 ENCOUNTER — TELEPHONE (OUTPATIENT)
Dept: ENDOCRINOLOGY | Age: 35
End: 2020-06-21

## 2020-06-21 LAB
THYROGLOBULIN AB: <0.9 IU/ML (ref 0–4)
THYROID PEROXIDASE (TPO) ABS: 3.1 IU/ML (ref 0–9)
THYROID STIMULATING IMMUNOGLOBULIN: <0.1 IU/L

## 2020-06-21 NOTE — TELEPHONE ENCOUNTER
Notify pt,  I have reviewed your recent results    Thyroid hormones results are within an acceptable range of normal. There is no need for treatment at this time. Thyroid US showed tiny thyroid nodule measuring 4 mm     Will continue following this nodule with intermittent US.  Will discuss this in details at your next OV

## 2020-10-08 ENCOUNTER — HOSPITAL ENCOUNTER (OUTPATIENT)
Age: 35
Discharge: HOME | End: 2020-10-08
Payer: COMMERCIAL

## 2020-10-08 ENCOUNTER — OFFICE VISIT (OUTPATIENT)
Dept: ENDOCRINOLOGY | Age: 35
End: 2020-10-08
Payer: COMMERCIAL

## 2020-10-08 VITALS
DIASTOLIC BLOOD PRESSURE: 78 MMHG | WEIGHT: 157 LBS | HEART RATE: 78 BPM | OXYGEN SATURATION: 98 % | BODY MASS INDEX: 31.71 KG/M2 | TEMPERATURE: 98 F | SYSTOLIC BLOOD PRESSURE: 124 MMHG | RESPIRATION RATE: 16 BRPM

## 2020-10-08 LAB
T4 FREE: 1.25 NG/DL (ref 0.93–1.7)
T4 TOTAL: 8.1 MCG/DL (ref 4.5–11.7)
TSH SERPL DL<=0.05 MIU/L-ACNC: 0.4 UIU/ML (ref 0.27–4.2)

## 2020-10-08 PROCEDURE — G8417 CALC BMI ABV UP PARAM F/U: HCPCS | Performed by: INTERNAL MEDICINE

## 2020-10-08 PROCEDURE — 99214 OFFICE O/P EST MOD 30 MIN: CPT | Performed by: INTERNAL MEDICINE

## 2020-10-08 PROCEDURE — 36415 COLL VENOUS BLD VENIPUNCTURE: CPT

## 2020-10-08 PROCEDURE — 1036F TOBACCO NON-USER: CPT | Performed by: INTERNAL MEDICINE

## 2020-10-08 PROCEDURE — G8484 FLU IMMUNIZE NO ADMIN: HCPCS | Performed by: INTERNAL MEDICINE

## 2020-10-08 PROCEDURE — G8427 DOCREV CUR MEDS BY ELIG CLIN: HCPCS | Performed by: INTERNAL MEDICINE

## 2020-10-08 PROCEDURE — 84443 ASSAY THYROID STIM HORMONE: CPT

## 2020-10-08 PROCEDURE — 84439 ASSAY OF FREE THYROXINE: CPT

## 2020-10-08 RX ORDER — HYDROCHLOROTHIAZIDE 25 MG/1
25 TABLET ORAL DAILY
COMMUNITY

## 2020-10-08 NOTE — PROGRESS NOTES
[address] [place] [address] Department of Endocrinology Diabetes and Metabolism   [address] [facility] [address]   Phone: [phone]  Fax: [phone]    Date of Service: 10/8/2020    Primary Care Physician: Elpidio Mckeon MD  Provider: Kelly Camacho MD    Reason for the visit:  Hyperthyroidism, MNG     History of Present Illness: The history is provided by the patient. No  was used. Accuracy of the patient data is excellent. Marco Morse is a very pleasant 38 y.o. female seen today for evaluation and management of hyperthyroidism     The patient told me that she was diagnosed with hyperthyroidism about 2 years ago but never required treatment   She has thyroid US and NM thyroid Uptake&scan in 2018 (result not available today)     6/2020 - Thyroid US small 4 mm hypoechoic nodule in Left lobe     Per pt no clear etiology identified for hyperthyroidism     Last Labs 3/2020 - TSH mildly suppressed at 0.33, free T4 normal 1.26    Patient reported feeling tired, Weight gain but denied palpitations, swelling in the area of the thyroid gland, tremor, sweating, heat intolerance, changes in bowel habits, or muscle weakness    PAST MEDICAL HISTORY   Past Medical History:   Diagnosis Date    Abnormal Pap smear     Back pain     Bronchitis     Chronic back pain     Endometriosis     H/O colposcopy with cervical biopsy     Headache     Hypertension     Placenta previa     Current pregnancy    Pregnancy induced hypertension     Thyroid disease     warm nodule & goiter       PAST SURGICAL HISTORY   Past Surgical History:   Procedure Laterality Date    HERNIA REPAIR      HYSTERECTOMY  2013    OVARY REMOVAL      TUBAL LIGATION  2013    WISDOM TOOTH EXTRACTION         SOCIAL HISTORY   Tobacco:   reports that she quit smoking about 4 years ago. Her smoking use included cigarettes. She smoked 0.00 packs per day.  She has never used smokeless tobacco.  Alcohol:   reports no history of alcohol use. Drugs:   reports no history of drug use. FAMILY HISTORY   Family History   Problem Relation Age of Onset    High Blood Pressure Mother        ALLERGIES AND DRUG REACTIONS   Allergies   Allergen Reactions    Aspirin      Other reaction(s): Vomiting    Nsaids Nausea And Vomiting       CURRENT MEDICATIONS   Current Outpatient Medications   Medication Sig Dispense Refill    hydroCHLOROthiazide (HYDRODIURIL) 25 MG tablet Take 25 mg by mouth daily      sodium chloride (OCEAN, BABY AYR) 0.65 % nasal spray 2 sprays by Nasal route as needed for Congestion 1 Bottle 0    metoprolol succinate (TOPROL XL) 25 MG extended release tablet Take 25 mg by mouth daily      vitamin D (CHOLECALCIFEROL) 1000 UNIT TABS tablet Take 1,000 Units by mouth daily       No current facility-administered medications for this visit. Review of Systems  Constitutional: No fever, no chills, no diaphoresis, no generalized weakness. HEENT: No blurred vision, No sore throat, no ear pain, no hair loss  Neck: denied any neck swelling, difficulty swallowing,   Cadrdiopulomary: No CP, SOB or palpitation, No orthopnea or PND. No cough or wheezing. GI: No N/V/D, no constipation, No abdominal pain, no melena or hematochezia   : Denied any dysuria, hematuria, flank pain, discharge, or incontinence. Skin: denied any rash, ulcer, Hirsute, or hyperpigmentation. MSK: denied any joint deformity, joint pain/swelling, muscle pain, or back pain.   Neuro: no numbess, no tingling, no weakness,     OBJECTIVE    /78 (Site: Right Upper Arm, Position: Sitting, Cuff Size: Medium Adult)   Pulse 78   Temp 98 °F (36.7 °C) (Temporal)   Resp 16   Wt 157 lb (71.2 kg)   LMP 05/11/2013   SpO2 98%   BMI 31.71 kg/m²   BP Readings from Last 4 Encounters:   10/08/20 124/78   02/29/20 (!) 168/90   12/23/19 124/78   09/22/19 128/88     Wt Readings from Last 6 Encounters:   10/08/20 157 lb (71.2 kg)   02/29/20 152 lb (68.9 kg) 12/23/19 148 lb (67.1 kg)   09/22/19 145 lb (65.8 kg)   06/20/19 136 lb (61.7 kg)   11/26/18 124 lb (56.2 kg)     Physical examination:  Due to this being a TeleHealth encounter, evaluation of the following organ systems is limited: Vitals/Constitutional/EENT/Resp/CV/GI//MS/Neuro/Skin/Heme-Lymph-Imm. Modified physical exam through Telemedicine camera    General: Communicating well via camera   Neck: no obvious neck mass. No obvious neck deformity     CVS: no distress   Chest: no distress. Chest is moving with respiration    Extremities:  no visible tremor  Skin: No visible rashes as seen from camera   Musculoskeletal: no visible deformity  Neuro: Alert and oriented to person, place, and time. Psychiatric: Normal mood and affect.  Behavior is normal    Review of Laboratory Data:  I have reviewed the following:  Lab Results   Component Value Date/Time    WBC 7.9 02/15/2017 05:30 PM    RBC 5.17 02/15/2017 05:30 PM    HGB 16.2 (H) 02/15/2017 05:30 PM    HCT 47.9 02/15/2017 05:30 PM    MCV 92.6 02/15/2017 05:30 PM    MCH 31.3 02/15/2017 05:30 PM    MCHC 33.8 02/15/2017 05:30 PM    RDW 12.0 02/15/2017 05:30 PM     02/15/2017 05:30 PM    MPV 8.8 02/15/2017 05:30 PM      Lab Results   Component Value Date/Time     06/18/2020 02:32 PM    K 4.1 06/18/2020 02:32 PM    CO2 24 06/18/2020 02:32 PM    BUN 15 06/18/2020 02:32 PM    CREATININE 0.8 06/18/2020 02:32 PM    CALCIUM 10.7 (H) 06/18/2020 02:32 PM    LABGLOM >60 06/18/2020 02:32 PM    GFRAA >60 06/18/2020 02:32 PM      Lab Results   Component Value Date/Time    TSH 0.396 10/08/2020 03:34 PM    T4FREE 1.25 10/08/2020 03:34 PM    O1CWZAU 8.1 10/08/2020 03:34 PM    N5JBDUM 111.90 06/18/2020 02:32 PM    TSI <0.10 06/18/2020 02:32 PM    TPOABS 3.1 06/18/2020 02:32 PM    THGAB <0.9 06/18/2020 02:32 PM     Lab Results   Component Value Date    GLUCOSE 94 06/18/2020    GLUCOSE 73 04/27/2012    LABCREA 10 09/21/2018     No results found for: TRIG, HDL, [address] [facility]  Lab Results   Component Value Date    VITD25 26 06/18/2020       Medical Records/Labs/Images Review:   I personally reviewed and summarized previous records   All labs and imaging were reviewed independently    Via Dago Virgen, a 38 y.o.-old female seen in for management of following issues      Previous Hyperthyroidism  · Resolved, Likely due to Thyroiditis   · Previous work up was negative for toxic nodule(s) and graves disease   · Obtain TSH, free T4 today     H/o MNG   · Thyroid US 6/2020 --> small 4 mm nodule   · tp repeat US in 6./2020     vitD deficiency    · Continue vitD supplement     Return in about 9 months (around 7/8/2021) for MNG , Hyperthyroidism . The above issues were reviewed with the patient who understood and agreed with the plan. Thank you for allowing us to participate in the care of this patient. Please do not hesitate to contact us with any additional questions. Diagnosis Orders   1. Hyperthyroidism  TSH without Reflex    T4, Free    T4   2. Multinodular goiter     3. Vitamin D deficiency         Sylvester Brar MD  Endocrinologist, [facility]   [address] [place] [address]   Phone: [phone]  Fax: [phone]  ---------------------------------  An electronic signature was used to authenticate this note.  Rolo Castañeda MD on 10/8/2020 at 5:15 PM

## 2020-10-09 ENCOUNTER — TELEPHONE (OUTPATIENT)
Dept: ENDOCRINOLOGY | Age: 35
End: 2020-10-09

## 2020-11-11 PROCEDURE — 96372 THER/PROPH/DIAG INJ SC/IM: CPT

## 2020-11-11 PROCEDURE — 99282 EMERGENCY DEPT VISIT SF MDM: CPT

## 2020-11-12 ENCOUNTER — HOSPITAL ENCOUNTER (EMERGENCY)
Age: 35
Discharge: HOME | End: 2020-11-12
Attending: EMERGENCY MEDICINE
Payer: COMMERCIAL

## 2020-11-12 VITALS
DIASTOLIC BLOOD PRESSURE: 88 MMHG | BODY MASS INDEX: 31.45 KG/M2 | OXYGEN SATURATION: 97 % | HEIGHT: 59 IN | WEIGHT: 156 LBS | TEMPERATURE: 97.3 F | RESPIRATION RATE: 16 BRPM | SYSTOLIC BLOOD PRESSURE: 142 MMHG | HEART RATE: 86 BPM

## 2020-11-12 PROCEDURE — 6360000002 HC RX W HCPCS: Performed by: EMERGENCY MEDICINE

## 2020-11-12 RX ORDER — DOCUSATE SODIUM 100 MG/1
100 CAPSULE, LIQUID FILLED ORAL 2 TIMES DAILY
Qty: 20 CAPSULE | Refills: 1 | Status: SHIPPED | OUTPATIENT
Start: 2020-11-12 | End: 2021-01-19

## 2020-11-12 RX ORDER — IBUPROFEN 800 MG/1
800 TABLET ORAL EVERY 8 HOURS PRN
Qty: 21 TABLET | Refills: 0 | Status: SHIPPED | OUTPATIENT
Start: 2020-11-12 | End: 2021-01-19

## 2020-11-12 RX ORDER — ONDANSETRON 4 MG/1
8 TABLET, ORALLY DISINTEGRATING ORAL EVERY 8 HOURS PRN
Qty: 20 TABLET | Refills: 0 | Status: SHIPPED | OUTPATIENT
Start: 2020-11-12 | End: 2021-01-19

## 2020-11-12 RX ORDER — OXYCODONE HYDROCHLORIDE AND ACETAMINOPHEN 5; 325 MG/1; MG/1
1 TABLET ORAL EVERY 6 HOURS PRN
Qty: 12 TABLET | Refills: 0 | Status: SHIPPED | OUTPATIENT
Start: 2020-11-12 | End: 2020-11-15

## 2020-11-12 RX ORDER — KETOROLAC TROMETHAMINE 30 MG/ML
30 INJECTION, SOLUTION INTRAMUSCULAR; INTRAVENOUS ONCE
Status: COMPLETED | OUTPATIENT
Start: 2020-11-12 | End: 2020-11-12

## 2020-11-12 RX ADMIN — KETOROLAC TROMETHAMINE 30 MG: 30 INJECTION, SOLUTION INTRAMUSCULAR at 01:00

## 2020-11-12 ASSESSMENT — PAIN DESCRIPTION - LOCATION: LOCATION: BREAST

## 2020-11-12 ASSESSMENT — PAIN DESCRIPTION - PAIN TYPE: TYPE: OTHER (COMMENT)

## 2020-11-12 ASSESSMENT — PAIN DESCRIPTION - ORIENTATION: ORIENTATION: RIGHT

## 2020-11-12 ASSESSMENT — PAIN SCALES - GENERAL
PAINLEVEL_OUTOF10: 8
PAINLEVEL_OUTOF10: 8

## 2020-11-12 NOTE — ED PROVIDER NOTES
Intramuscular Given 11/12/20 0100)         ED COURSE:       Medical Decision Making:   Differential diagnoses:  Fibrocystic breast disease, breast CA, mastitis, to name a few. Bedside ultrasound was done with female RN chaperone present and did not show any evidence of any cystic disease nor any discrete masses but tenderness was elicited on palpation during ultrasound exam.  Patient advised of the necessity for having a mammogram for right breast and will have her to follow-up with her PCP for referral for such testing tomorrow. Of note in the patient's allergy list aspirin is recorded; however, patient states she has taken Motrin and Advil in the past she did not have any untoward reaction to Toradol administered here in the emergency department. PROCEDURE NOTE   11/12/20         Bedside Breast  EXAM  ULTRASOUND   Risks, benefits and alternatives (for applicable procedures below) described. Performed By: Laverna Lesch, MD.  Indication: R Breast pain/tenderness  Informed consent: by patient or legal gardian. Interpretations:     No evidence of any cystic masses nor lesions evident positive tenderness on palpation during ultrasound of the right breast       Controlled Substance Monitoring:  Acute and Chronic Pain Monitoring:   RX Monitoring 11/12/2020   Attestation -   Periodic Controlled Substance Monitoring No signs of potential drug abuse or diversion identified. Chronic Pain > 80 MEDD -       Counseling: The emergency provider has spoken with the patient and discussed todays results, in addition to providing specific details for the plan of care and counseling regarding the diagnosis and prognosis. Questions are answered at this time and they are agreeable with the plan.      --------------------------------- IMPRESSION AND DISPOSITION ---------------------------------    IMPRESSION  1.  Breast pain, right        DISPOSITION  Disposition: Discharge to home  Patient condition is stable      NOTE: This report was transcribed using voice recognition software.  Every effort was made to ensure accuracy; however, inadvertent computerized transcription errors may be present             Laura Izquierdo MD  11/12/20 [id number]

## 2021-01-19 ENCOUNTER — HOSPITAL ENCOUNTER (EMERGENCY)
Age: 36
Discharge: HOME | End: 2021-01-19
Attending: FAMILY MEDICINE
Payer: COMMERCIAL

## 2021-01-19 ENCOUNTER — APPOINTMENT (OUTPATIENT)
Dept: GENERAL RADIOLOGY | Age: 36
End: 2021-01-19
Payer: COMMERCIAL

## 2021-01-19 VITALS
DIASTOLIC BLOOD PRESSURE: 86 MMHG | WEIGHT: 152 LBS | BODY MASS INDEX: 30.64 KG/M2 | SYSTOLIC BLOOD PRESSURE: 128 MMHG | HEIGHT: 59 IN | HEART RATE: 86 BPM | TEMPERATURE: 96.9 F | RESPIRATION RATE: 16 BRPM | OXYGEN SATURATION: 100 %

## 2021-01-19 DIAGNOSIS — M47.812 OSTEOARTHRITIS OF CERVICAL SPINE, UNSPECIFIED SPINAL OSTEOARTHRITIS COMPLICATION STATUS: ICD-10-CM

## 2021-01-19 DIAGNOSIS — M43.6 TORTICOLLIS: Primary | ICD-10-CM

## 2021-01-19 PROCEDURE — 96372 THER/PROPH/DIAG INJ SC/IM: CPT

## 2021-01-19 PROCEDURE — 99283 EMERGENCY DEPT VISIT LOW MDM: CPT

## 2021-01-19 PROCEDURE — 6360000002 HC RX W HCPCS: Performed by: FAMILY MEDICINE

## 2021-01-19 PROCEDURE — 72050 X-RAY EXAM NECK SPINE 4/5VWS: CPT

## 2021-01-19 RX ORDER — TRAMADOL HYDROCHLORIDE 50 MG/1
50 TABLET ORAL EVERY 8 HOURS PRN
Qty: 10 TABLET | Refills: 0 | Status: SHIPPED | OUTPATIENT
Start: 2021-01-19 | End: 2021-01-23

## 2021-01-19 RX ORDER — CYCLOBENZAPRINE HCL 10 MG
10 TABLET ORAL 3 TIMES DAILY PRN
Qty: 15 TABLET | Refills: 0 | Status: SHIPPED | OUTPATIENT
Start: 2021-01-19 | End: 2021-01-29

## 2021-01-19 RX ORDER — NAPROXEN 500 MG/1
500 TABLET ORAL 2 TIMES DAILY
Qty: 20 TABLET | Refills: 0 | Status: SHIPPED | OUTPATIENT
Start: 2021-01-19 | End: 2021-12-19

## 2021-01-19 RX ORDER — KETOROLAC TROMETHAMINE 30 MG/ML
60 INJECTION, SOLUTION INTRAMUSCULAR; INTRAVENOUS ONCE
Status: COMPLETED | OUTPATIENT
Start: 2021-01-19 | End: 2021-01-19

## 2021-01-19 RX ORDER — ORPHENADRINE CITRATE 30 MG/ML
60 INJECTION INTRAMUSCULAR; INTRAVENOUS ONCE
Status: COMPLETED | OUTPATIENT
Start: 2021-01-19 | End: 2021-01-19

## 2021-01-19 RX ADMIN — KETOROLAC TROMETHAMINE 60 MG: 30 INJECTION, SOLUTION INTRAMUSCULAR at 10:07

## 2021-01-19 RX ADMIN — ORPHENADRINE CITRATE 60 MG: 60 INJECTION INTRAMUSCULAR; INTRAVENOUS at 10:07

## 2021-01-19 ASSESSMENT — PAIN DESCRIPTION - LOCATION: LOCATION: NECK;SHOULDER

## 2021-01-19 ASSESSMENT — PAIN SCALES - GENERAL
PAINLEVEL_OUTOF10: 10
PAINLEVEL_OUTOF10: 10

## 2021-01-19 ASSESSMENT — PAIN DESCRIPTION - PROGRESSION: CLINICAL_PROGRESSION: GRADUALLY WORSENING

## 2021-01-19 ASSESSMENT — PAIN DESCRIPTION - ONSET: ONSET: ON-GOING

## 2021-01-19 ASSESSMENT — PAIN - FUNCTIONAL ASSESSMENT: PAIN_FUNCTIONAL_ASSESSMENT: PREVENTS OR INTERFERES SOME ACTIVE ACTIVITIES AND ADLS

## 2021-01-19 ASSESSMENT — PAIN DESCRIPTION - PAIN TYPE: TYPE: ACUTE PAIN
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
## 2021-01-19 NOTE — ED NOTES
Pain unchanged. Scripts given. Discharged with a followup to PCP.  Return here with worsenings or concerns     Duncan Baker RN  01/19/21 4537

## 2021-03-12 ENCOUNTER — HOSPITAL ENCOUNTER (EMERGENCY)
Age: 36
Discharge: HOME | End: 2021-03-12
Attending: EMERGENCY MEDICINE
Payer: COMMERCIAL

## 2021-03-12 VITALS
HEIGHT: 59 IN | WEIGHT: 152 LBS | DIASTOLIC BLOOD PRESSURE: 88 MMHG | OXYGEN SATURATION: 98 % | BODY MASS INDEX: 30.64 KG/M2 | HEART RATE: 100 BPM | SYSTOLIC BLOOD PRESSURE: 136 MMHG | RESPIRATION RATE: 16 BRPM | TEMPERATURE: 99.3 F

## 2021-03-12 DIAGNOSIS — J02.0 STREP PHARYNGITIS: Primary | ICD-10-CM

## 2021-03-12 LAB — STREP GRP A PCR: NEGATIVE

## 2021-03-12 PROCEDURE — 87880 STREP A ASSAY W/OPTIC: CPT

## 2021-03-12 PROCEDURE — 6370000000 HC RX 637 (ALT 250 FOR IP): Performed by: EMERGENCY MEDICINE

## 2021-03-12 PROCEDURE — 99284 EMERGENCY DEPT VISIT MOD MDM: CPT

## 2021-03-12 RX ORDER — AMOXICILLIN 250 MG/1
500 CAPSULE ORAL 2 TIMES DAILY
Qty: 40 CAPSULE | Refills: 0 | Status: SHIPPED | OUTPATIENT
Start: 2021-03-12 | End: 2021-03-22

## 2021-03-12 RX ORDER — IBUPROFEN 800 MG/1
800 TABLET ORAL ONCE
Status: COMPLETED | OUTPATIENT
Start: 2021-03-12 | End: 2021-03-12

## 2021-03-12 RX ADMIN — IBUPROFEN 800 MG: 800 TABLET, FILM COATED ORAL at 11:44

## 2021-03-12 ASSESSMENT — PAIN SCALES - GENERAL
PAINLEVEL_OUTOF10: 8
PAINLEVEL_OUTOF10: 6

## 2021-03-12 ASSESSMENT — PAIN DESCRIPTION - LOCATION: LOCATION: EAR;THROAT

## 2021-03-12 ASSESSMENT — PAIN DESCRIPTION - PAIN TYPE: TYPE: ACUTE PAIN

## 2021-03-12 ASSESSMENT — PAIN DESCRIPTION - ORIENTATION
ORIENTATION: RIGHT

## 2021-03-12 ASSESSMENT — PAIN DESCRIPTION - PROGRESSION: CLINICAL_PROGRESSION: GRADUALLY IMPROVING

## 2021-03-12 ASSESSMENT — PAIN DESCRIPTION - DESCRIPTORS: DESCRIPTORS: ACHING

## 2021-03-12 NOTE — ED PROVIDER NOTES
HPI:  3/12/21, Time: 11:42 AM EST Ellison Soulier is a 38 y.o. female presenting to the ED for sore throat (worse on right),  right ear pain and fever (Tmax 101 today)  beginning 2 days ago. Took tylenol this morning. Her daughter is sick now. No concern for Covid, but feels like strep throat. The complaint has been persistent, moderate in severity, and worsened by nothing. Patient denies body aches, cough, congestion, chest pain, shortness of breath, edema, headache, visual disturbances, focal paresthesias, focal weakness, abdominal pain, nausea, vomiting, diarrhea, constipation, dysuria, hematuria, trauma, neck or back pain, rash or other complaints. ROS:   A complete review of systems was performed and all pertinent positives and negatives are stated within HPI, all other systems reviewed and are negative.      --------------------------------------------- PAST HISTORY ---------------------------------------------  Past Medical History:  has a past medical history of Abnormal Pap smear, Back pain, Bronchitis, Chronic back pain, Endometriosis, H/O colposcopy with cervical biopsy, Headache, Hypertension, Placenta previa, Pregnancy induced hypertension, and Thyroid disease. Past Surgical History:  has a past surgical history that includes Ovary removal; [place] tooth extraction; Tubal ligation (2013); Hysterectomy (2013); and hernia repair. Social History:  reports that she quit smoking about 5 years ago. Her smoking use included cigarettes. She smoked 0.00 packs per day. She has never used smokeless tobacco. She reports that she does not drink alcohol or use drugs. Family History: family history includes High Blood Pressure in her mother. The patients home medications have been reviewed.     Allergies: Aspirin and Nsaids        ----------------------------------------PHYSICAL EXAM--------------------------------------  Constitutional:  Well developed, well nourished, no acute distress, non-toxic appearance   Eyes:  PERRL, conjunctiva normal, EOMI  HENT:  Atraumatic, external ears normal, nose normal, oropharynx moist, no pharyngeal exudates but bilateral redness and swelling noted (symmetrical). Airway patent. TMs clear and intact bilaterally. Neck- normal range of motion, no nuchal rigidity. Respiratory:  No respiratory distress, normal breath sounds, no rales, no wheezing   Cardiovascular:  Normal rate, normal rhythm, no murmurs, no gallops, no rubs. Radial pulses 2+ bilaterally. GI:  Soft, nondistended, normal bowel sounds, nontender, no organomegaly, no mass, no rebound, no guarding   :  No costovertebral angle tenderness   Musculoskeletal:  No edema, no tenderness, no deformities. Integument:  Well hydrated, no visible rash. Adequate perfusion. Lymphatic:  Tender bilateral anterior cervical lymphadenopathy noted   Neurologic:  Alert & oriented x 3, CN 2-12 normal, no focal deficits noted. Normal gait. Psychiatric:  Speech and behavior appropriate       -------------------------------------------------- RESULTS -------------------------------------------------  I have personally reviewed all laboratory and imaging results for this patient. Results are listed below. LABS:  Results for orders placed or performed during the hospital encounter of 03/12/21   Strep Screen Group A Throat    Specimen: Throat   Result Value Ref Range    Strep Grp A PCR Negative Negative       RADIOLOGY:  Interpreted by Radiologist.  No orders to display         ------------------------- NURSING NOTES AND VITALS REVIEWED ---------------------------  The nursing notes within the ED encounter and vital signs as below have been reviewed by myself.     /88   Pulse 100   Temp 99.3 °F (37.4 °C) (Oral)   Resp 16   Ht 4' 11\" (1.499 m)   Wt 152 lb (68.9 kg)   LMP 05/11/2013   SpO2 98%   BMI 30.70 kg/m²   Oxygen Saturation Interpretation: Normal      The patients available past medical records and past encounters were reviewed. ------------------------------ ED COURSE/MEDICAL DECISION MAKING----------------------  Medications   ibuprofen (ADVIL;MOTRIN) tablet 800 mg (800 mg Oral Given 3/12/21 1144)           Procedures:   none      Medical Decision Making:    Centor Score (MODIFIED) For Strep Pharyngitis:    Age 3-14 Years   no (0)     Age 14-39 Years   yes (1)     Age >44 Years   NO     Exudate or Swelling on Tonsils   yes (1)     Tender/Swollen Anterior Cervical Nodes   yes (1)     Fever? (T > 38°C, 100.4°F)   yes (1)     Absence of Cough   yes (1)   TOTAL POINTS   5   Score of Zero = Probability of Strep Pharyngitis: 1% - 2.5%. ,   No further testing nor antibiotics. Score of 1 = Probability of Strep Pharyngitis: 5% - 10%. ,   No further testing nor antibiotics. Score of 2 = Probability of Strep Phar: 11% - 17%. Culture/test all, ATB's only for positive culture results. Score of 3 = Probability of Strep Phar: 28% - 35%. Culture/test all, ATB's only for positive culture results  Score of 4 or 5 = Probability of Strep Pharyngitis: 51% - 53%. Treat empirically with antibiotics. Tolerated popsicle in ER. Motrin in ER  Swab neg but likely false negative given Centor  Declines covid swab      This patient's ED course included: a personal history and physicial examination, re-evaluation prior to disposition and a personal history and physicial eaxmination    This patient has remained hemodynamically stable, improved and been closely monitored during their ED course. Re-Evaluations:  Time: 12:56 PM EST   Re-evaluation. Patients symptoms are improving  Repeat physical examination is improved        Consultations:   none    Critical Care: none    May HODGES Do, DO, am the Primary Provider of Record    Counseling:   The emergency provider has spoken with the patient and discussed todays results, in addition to providing specific details for the plan of care and counseling regarding the diagnosis and prognosis. Questions are answered at this time and they are agreeable with the plan.    --------------------------- IMPRESSION AND DISPOSITION ---------------------------------    IMPRESSION  1.  Strep pharyngitis        DISPOSITION  Disposition: Discharge to home  Patient condition is stable             Eliseo Pinedo DO  03/12/21 1326

## 2021-07-08 ENCOUNTER — OFFICE VISIT (OUTPATIENT)
Dept: ENDOCRINOLOGY | Age: 36
End: 2021-07-08
Payer: COMMERCIAL

## 2021-07-08 VITALS
DIASTOLIC BLOOD PRESSURE: 78 MMHG | SYSTOLIC BLOOD PRESSURE: 130 MMHG | OXYGEN SATURATION: 98 % | HEART RATE: 84 BPM | RESPIRATION RATE: 18 BRPM | HEIGHT: 59 IN | BODY MASS INDEX: 30.24 KG/M2 | WEIGHT: 150 LBS

## 2021-07-08 DIAGNOSIS — E05.90 HYPERTHYROIDISM: Primary | ICD-10-CM

## 2021-07-08 DIAGNOSIS — E04.2 MULTINODULAR GOITER: ICD-10-CM

## 2021-07-08 DIAGNOSIS — E55.9 VITAMIN D DEFICIENCY: ICD-10-CM

## 2021-07-08 PROCEDURE — G8427 DOCREV CUR MEDS BY ELIG CLIN: HCPCS | Performed by: INTERNAL MEDICINE

## 2021-07-08 PROCEDURE — G8417 CALC BMI ABV UP PARAM F/U: HCPCS | Performed by: INTERNAL MEDICINE

## 2021-07-08 PROCEDURE — 1036F TOBACCO NON-USER: CPT | Performed by: INTERNAL MEDICINE

## 2021-07-08 PROCEDURE — 99214 OFFICE O/P EST MOD 30 MIN: CPT | Performed by: INTERNAL MEDICINE

## 2021-07-08 NOTE — PROGRESS NOTES
[address] [place] [address] Department of Endocrinology Diabetes and Metabolism   [address] [facility] [address]   Phone: [phone]  Fax: [phone]    Date of Service: 7/8/2021  Primary Care Physician: Rosanna Stern MD  Provider: Carolina Nath MD    Reason for the visit:  Hyperthyroidism, MNG     History of Present Illness: The history is provided by the patient. No  was used. Accuracy of the patient data is excellent. Paul Lugo is a very pleasant 43 y.o. female seen today for evaluation and management of hyperthyroidism     The patient told me that she was diagnosed with hyperthyroidism about 2 years ago but never required treatment   She has thyroid US and NM thyroid Uptake&scan in 2018 (result not available today)     6/2020 - Thyroid US small 4 mm hypoechoic nodule in Left lobe     Labs 3/2020 - TSH mildly suppressed at 0.33, free T4 normal 1.26  10/2020 - TSH 0.396, FT4 1.25     Lab Results   Component Value Date/Time    TSI <0.10 06/18/2020 02:32 PM    TPOABS 3.1 06/18/2020 02:32 PM    Amanda Yong <0.9 06/18/2020 02:32 PM       Patient reported feeling tired, Weight gain but denied palpitations, swelling in the area of the thyroid gland, tremor, sweating, heat intolerance, changes in bowel habits, or muscle weakness    PAST MEDICAL HISTORY   Past Medical History:   Diagnosis Date    Abnormal Pap smear     Back pain     Bronchitis     Chronic back pain     Endometriosis     H/O colposcopy with cervical biopsy     Headache     Hypertension     Placenta previa     Current pregnancy    Pregnancy induced hypertension     Thyroid disease     warm nodule & goiter       PAST SURGICAL HISTORY   Past Surgical History:   Procedure Laterality Date    HERNIA REPAIR      HYSTERECTOMY  2013    OVARY REMOVAL      TUBAL LIGATION  2013    WISDOM TOOTH EXTRACTION         SOCIAL HISTORY   Tobacco:   reports that she quit smoking about 5 years ago.  Her smoking use included cigarettes. She smoked 0.00 packs per day. She has never used smokeless tobacco.  Alcohol:   reports no history of alcohol use. Drugs:   reports no history of drug use. FAMILY HISTORY   Family History   Problem Relation Age of Onset    High Blood Pressure Mother        ALLERGIES AND DRUG REACTIONS   Allergies   Allergen Reactions    Aspirin      Other reaction(s): Vomiting    Nsaids Nausea And Vomiting       CURRENT MEDICATIONS   Current Outpatient Medications   Medication Sig Dispense Refill    hydroCHLOROthiazide (HYDRODIURIL) 25 MG tablet Take 25 mg by mouth daily      metoprolol succinate (TOPROL XL) 25 MG extended release tablet Take 25 mg by mouth daily      naproxen (NAPROSYN) 500 MG tablet Take 1 tablet by mouth 2 times daily for 10 days 20 tablet 0     No current facility-administered medications for this visit. Review of Systems  Constitutional: No fever, no chills, no diaphoresis, no generalized weakness. HEENT: No blurred vision, No sore throat, no ear pain, no hair loss  Neck: denied any neck swelling, difficulty swallowing,   Cadrdiopulomary: No CP, SOB or palpitation, No orthopnea or PND. No cough or wheezing. GI: No N/V/D, no constipation, No abdominal pain, no melena or hematochezia   : Denied any dysuria, hematuria, flank pain, discharge, or incontinence. Skin: denied any rash, ulcer, Hirsute, or hyperpigmentation. MSK: denied any joint deformity, joint pain/swelling, muscle pain, or back pain.   Neuro: no numbess, no tingling, no weakness,     OBJECTIVE    /78   Pulse 84   Resp 18   Ht 4' 11\" (1.499 m)   Wt 150 lb (68 kg)   LMP 05/11/2013   SpO2 98%   BMI 30.30 kg/m²   BP Readings from Last 4 Encounters:   07/08/21 130/78   03/12/21 136/88   01/19/21 128/86   11/12/20 (!) 142/88     Wt Readings from Last 6 Encounters:   07/08/21 150 lb (68 kg)   03/12/21 152 lb (68.9 kg)   01/19/21 152 lb (68.9 kg)   11/12/20 156 lb (70.8 kg)   10/08/20 157 lb (71.2 kg) 02/29/20 152 lb (68.9 kg)     Physical examination:  General: awake alert, oriented x3  HEENT: normocephalic non traumatic, no exophthalmos   Neck: supple, thyroid tenderness,  Pulm: Clear equal air entry no added sounds  CVS: S1 + S2  Abd: soft lax, no tenderness  Skin: warm, no lesions, no rash   Neuro: CN intact,  muscle power normal  Psych: normal mood, and affect    Review of Laboratory Data:  I have reviewed the following:  Lab Results   Component Value Date/Time    WBC 7.9 02/15/2017 05:30 PM    RBC 5.17 02/15/2017 05:30 PM    HGB 16.2 (H) 02/15/2017 05:30 PM    HCT 47.9 02/15/2017 05:30 PM    MCV 92.6 02/15/2017 05:30 PM    MCH 31.3 02/15/2017 05:30 PM    MCHC 33.8 02/15/2017 05:30 PM    RDW 12.0 02/15/2017 05:30 PM     02/15/2017 05:30 PM    MPV 8.8 02/15/2017 05:30 PM      Lab Results   Component Value Date/Time     06/18/2020 02:32 PM    K 4.1 06/18/2020 02:32 PM    CO2 24 06/18/2020 02:32 PM    BUN 15 06/18/2020 02:32 PM    CREATININE 0.8 06/18/2020 02:32 PM    CALCIUM 10.7 (H) 06/18/2020 02:32 PM    LABGLOM >60 06/18/2020 02:32 PM    GFRAA >60 06/18/2020 02:32 PM      Lab Results   Component Value Date/Time    TSH 0.396 10/08/2020 03:34 PM    T4FREE 1.25 10/08/2020 03:34 PM    S4UUJSJ 8.1 10/08/2020 03:34 PM    Z7MVGJF 111.90 06/18/2020 02:32 PM    TSI <0.10 06/18/2020 02:32 PM    TPOABS 3.1 06/18/2020 02:32 PM    THGAB <0.9 06/18/2020 02:32 PM     Lab Results   Component Value Date    GLUCOSE 94 06/18/2020    GLUCOSE 73 04/27/2012    LABCREA 10 09/21/2018     No results found for: TRIG, HDL, LDLCALC, CHOL  Lab Results   Component Value Date    VITD25 26 06/18/2020     ASSESSMENT & RECOMMENDATIONS   Danae Parra, a 43 y.o.-old female seen in for management of following issues      Previous Hyperthyroidism  · Resolved, Likely due to Thyroiditis   · Previous work up was negative for toxic nodule(s) and graves disease   · Obtain TSH, free T4  Now     H/o MNG   · Thyroid US 6/2020 --> small 4 mm nodule   · Will continue monitoring with intermittent US     vitD deficiency    · Continue vitD supplement     I personally reviewed external notes from PCP and other patient's care team providers, and personally interpreted labs associated with the above diagnosis. I also ordered labs to further assess and manage the above addressed medical conditions    Return in about 7 months (around 2/8/2022) for Multinodular goiter, Hyperthyroidism, VitD deficiency. The above issues were reviewed with the patient who understood and agreed with the plan. Thank you for allowing us to participate in the care of this patient. Please do not hesitate to contact us with any additional questions. Diagnosis Orders   1. Hyperthyroidism  TSH without Reflex    T4, Free    US THYROID   2. Multinodular goiter  TSH without Reflex    T4, Free   3. Vitamin D deficiency  Vitamin D 25 Hydroxy    Basic Metabolic Panel       Ananth Colunga MD  Endocrinologist, [facility] Diabetes Care and Endocrinology   [address] [place] [address]   Phone: [phone]  Fax: [phone]  ---------------------------------  An electronic signature was used to authenticate this note.  Thao Guadalupe MD on 7/8/2021 at 11:09 AM

## 2021-07-11 ENCOUNTER — HOSPITAL ENCOUNTER (EMERGENCY)
Age: 36
Discharge: HOME | End: 2021-07-11
Attending: EMERGENCY MEDICINE
Payer: COMMERCIAL

## 2021-07-11 VITALS
BODY MASS INDEX: 30.24 KG/M2 | SYSTOLIC BLOOD PRESSURE: 140 MMHG | HEART RATE: 73 BPM | OXYGEN SATURATION: 100 % | WEIGHT: 150 LBS | HEIGHT: 59 IN | TEMPERATURE: 97.3 F | RESPIRATION RATE: 16 BRPM | DIASTOLIC BLOOD PRESSURE: 96 MMHG

## 2021-07-11 DIAGNOSIS — M54.2 NECK PAIN: Primary | ICD-10-CM

## 2021-07-11 PROCEDURE — 96372 THER/PROPH/DIAG INJ SC/IM: CPT

## 2021-07-11 PROCEDURE — 99284 EMERGENCY DEPT VISIT MOD MDM: CPT

## 2021-07-11 PROCEDURE — 6360000002 HC RX W HCPCS: Performed by: EMERGENCY MEDICINE

## 2021-07-11 PROCEDURE — 6370000000 HC RX 637 (ALT 250 FOR IP): Performed by: EMERGENCY MEDICINE

## 2021-07-11 RX ORDER — DEXAMETHASONE SODIUM PHOSPHATE 10 MG/ML
8 INJECTION, SOLUTION INTRAMUSCULAR; INTRAVENOUS ONCE
Status: COMPLETED | OUTPATIENT
Start: 2021-07-11 | End: 2021-07-11

## 2021-07-11 RX ORDER — OXYCODONE HYDROCHLORIDE AND ACETAMINOPHEN 5; 325 MG/1; MG/1
1 TABLET ORAL EVERY 6 HOURS PRN
Qty: 12 TABLET | Refills: 0 | Status: SHIPPED | OUTPATIENT
Start: 2021-07-11 | End: 2021-07-14

## 2021-07-11 RX ORDER — ORPHENADRINE CITRATE 30 MG/ML
60 INJECTION INTRAMUSCULAR; INTRAVENOUS ONCE
Status: COMPLETED | OUTPATIENT
Start: 2021-07-11 | End: 2021-07-11

## 2021-07-11 RX ORDER — CYCLOBENZAPRINE HCL 10 MG
10 TABLET ORAL 3 TIMES DAILY PRN
Qty: 21 TABLET | Refills: 0 | Status: SHIPPED | OUTPATIENT
Start: 2021-07-11 | End: 2021-07-21

## 2021-07-11 RX ORDER — HYDROCODONE BITARTRATE AND ACETAMINOPHEN 5; 325 MG/1; MG/1
2 TABLET ORAL ONCE
Status: COMPLETED | OUTPATIENT
Start: 2021-07-11 | End: 2021-07-11

## 2021-07-11 RX ADMIN — DEXAMETHASONE SODIUM PHOSPHATE 8 MG: 10 INJECTION, SOLUTION INTRAMUSCULAR; INTRAVENOUS at 13:19

## 2021-07-11 RX ADMIN — ORPHENADRINE CITRATE 60 MG: 30 INJECTION INTRAMUSCULAR; INTRAVENOUS at 13:19

## 2021-07-11 RX ADMIN — HYDROCODONE BITARTRATE AND ACETAMINOPHEN 2 TABLET: 5; 325 TABLET ORAL at 13:19

## 2021-07-11 ASSESSMENT — PAIN SCALES - GENERAL
PAINLEVEL_OUTOF10: 10
PAINLEVEL_OUTOF10: 10
PAINLEVEL_OUTOF10: 7

## 2021-07-11 ASSESSMENT — PAIN DESCRIPTION - LOCATION: LOCATION: NECK

## 2021-07-11 ASSESSMENT — PAIN DESCRIPTION - ORIENTATION: ORIENTATION: RIGHT

## 2021-07-11 ASSESSMENT — PAIN DESCRIPTION - DESCRIPTORS: DESCRIPTORS: SHARP;TINGLING

## 2021-07-11 ASSESSMENT — PAIN DESCRIPTION - DIRECTION: RADIATING_TOWARDS: RIGHT ARM

## 2021-07-11 NOTE — ED PROVIDER NOTES
HPI:  7/11/21,   Time: 1:11 PM EDT       Danae Parra is a 43 y.o. female presenting to the ED for right lat neck pain, beginning 2 days ago. The complaint has been persistent, moderate in severity, and worsened by movement of neck. Feels like spams, burning pain, hx same, not this severe. Better with rest.  No fever/chills/sweats/cough/abd pain/cp/incontinence or saddle anesthesia    Review of Systems:   Pertinent positives and negatives are stated within HPI, all other systems reviewed and are negative.          --------------------------------------------- PAST HISTORY ---------------------------------------------  Past Medical History:  has a past medical history of Abnormal Pap smear, Back pain, Bronchitis, Chronic back pain, Endometriosis, H/O colposcopy with cervical biopsy, Headache, Hypertension, Placenta previa, Pregnancy induced hypertension, and Thyroid disease. Past Surgical History:  has a past surgical history that includes Ovary removal; [place] tooth extraction; Tubal ligation (2013); Hysterectomy (2013); and hernia repair. Social History:  reports that she quit smoking about 5 years ago. Her smoking use included cigarettes. She smoked 0.00 packs per day. She has never used smokeless tobacco. She reports that she does not drink alcohol and does not use drugs. Family History: family history includes High Blood Pressure in her mother. The patients home medications have been reviewed.     Allergies: Aspirin and Nsaids        ---------------------------------------------------PHYSICAL EXAM--------------------------------------    Constitutional/General: Alert and oriented x3, well appearing, non toxic in NAD  Head: Normocephalic and atraumatic  Eyes: PERRL, EOMI, conjunctive normal, sclera non icteric  Mouth: Oropharynx clear, handling secretions,   Neck: Supple, limited rom due to pain, no meningeal sign, right lat neck spasms and tenderness, no enlargement/swelling/fluid collection felt  Respiratory:Not in respiratory distress  Cardiovascular:  2+ distal pulses  Chest: No chest wall tenderness     Musculoskeletal: Moves all extremities x 4. Warm and well perfused, no clubbing, cyanosis, or edema. Capillary refill <3 seconds  Integument: skin warm and dry. No rashes. Lymphatic: no lymphadenopathy noted  Neurologic: GCS 15, no focal deficits, nml motor  Psychiatric: Normal Affect    -------------------------------------------------- RESULTS -------------------------------------------------  I have personally reviewed all laboratory and imaging results for this patient. Results are listed below. LABS:  No results found for this visit on 07/11/21. RADIOLOGY:  Interpreted by Radiologist.  No orders to display       EKG: This EKG is signed and interpreted by the EP. Time:   Rate:   Rhythm:   Interpretation:   Comparison:       ------------------------- NURSING NOTES AND VITALS REVIEWED ---------------------------   The nursing notes within the ED encounter and vital signs as below have been reviewed by myself. BP (!) 140/96   Pulse 73   Temp 97.3 °F (36.3 °C) (Infrared)   Resp 16   Ht 4' 11\" (1.499 m)   Wt 150 lb (68 kg)   LMP 05/11/2013   SpO2 100%   BMI 30.30 kg/m²   Oxygen Saturation Interpretation: Normal    The patients available past medical records and past encounters were reviewed.         ------------------------------ ED COURSE/MEDICAL DECISION MAKING----------------------  Medications   orphenadrine (NORFLEX) injection 60 mg (60 mg Intramuscular Given 7/11/21 1319)   dexamethasone (PF) (DECADRON) injection 8 mg (8 mg Intramuscular Given 7/11/21 1319)   HYDROcodone-acetaminophen (NORCO) 5-325 MG per tablet 2 tablet (2 tablets Oral Given 7/11/21 1319)         ED COURSE:       Medical Decision Making:    Muscular spasm, better with tx, dc supp care      This patient's ED course included: a personal history and physicial examination    This patient has remained hemodynamically stable during their ED course. Re-Evaluations:             Re-evaluation. Patients symptoms are improving            Counseling: The emergency provider has spoken with the patient and discussed todays results, in addition to providing specific details for the plan of care and counseling regarding the diagnosis and prognosis. Questions are answered at this time and they are agreeable with the plan.       --------------------------------- IMPRESSION AND DISPOSITION ---------------------------------    IMPRESSION  1. Neck pain        DISPOSITION  Disposition: Discharge to home  Patient condition is stable    NOTE: This report was transcribed using voice recognition software.  Every effort was made to ensure accuracy; however, inadvertent computerized transcription errors may be present        Lesly Stubbs MD  07/11/21 [id number]

## 2021-07-13 ENCOUNTER — APPOINTMENT (OUTPATIENT)
Dept: CT IMAGING | Age: 36
End: 2021-07-13
Payer: COMMERCIAL

## 2021-07-13 ENCOUNTER — APPOINTMENT (OUTPATIENT)
Dept: GENERAL RADIOLOGY | Age: 36
End: 2021-07-13
Payer: COMMERCIAL

## 2021-07-13 ENCOUNTER — HOSPITAL ENCOUNTER (EMERGENCY)
Age: 36
Discharge: HOME | End: 2021-07-13
Payer: COMMERCIAL

## 2021-07-13 VITALS
BODY MASS INDEX: 30.24 KG/M2 | HEART RATE: 72 BPM | WEIGHT: 150 LBS | TEMPERATURE: 98 F | DIASTOLIC BLOOD PRESSURE: 83 MMHG | HEIGHT: 59 IN | OXYGEN SATURATION: 98 % | SYSTOLIC BLOOD PRESSURE: 123 MMHG | RESPIRATION RATE: 18 BRPM

## 2021-07-13 DIAGNOSIS — M43.6 TORTICOLLIS: Primary | ICD-10-CM

## 2021-07-13 DIAGNOSIS — M25.511 ACUTE PAIN OF RIGHT SHOULDER: ICD-10-CM

## 2021-07-13 DIAGNOSIS — M54.2 NECK PAIN: ICD-10-CM

## 2021-07-13 DIAGNOSIS — M47.22 OSTEOARTHRITIS OF SPINE WITH RADICULOPATHY, CERVICAL REGION: ICD-10-CM

## 2021-07-13 PROCEDURE — 99284 EMERGENCY DEPT VISIT MOD MDM: CPT

## 2021-07-13 PROCEDURE — 73030 X-RAY EXAM OF SHOULDER: CPT

## 2021-07-13 PROCEDURE — 72128 CT CHEST SPINE W/O DYE: CPT

## 2021-07-13 PROCEDURE — 6370000000 HC RX 637 (ALT 250 FOR IP): Performed by: NURSE PRACTITIONER

## 2021-07-13 PROCEDURE — 72125 CT NECK SPINE W/O DYE: CPT

## 2021-07-13 PROCEDURE — 6360000002 HC RX W HCPCS: Performed by: NURSE PRACTITIONER

## 2021-07-13 PROCEDURE — 96372 THER/PROPH/DIAG INJ SC/IM: CPT

## 2021-07-13 RX ORDER — DIAZEPAM 5 MG/1
5 TABLET ORAL EVERY 8 HOURS PRN
Qty: 10 TABLET | Refills: 0 | Status: SHIPPED | OUTPATIENT
Start: 2021-07-13 | End: 2021-07-18

## 2021-07-13 RX ORDER — OXYCODONE HYDROCHLORIDE AND ACETAMINOPHEN 5; 325 MG/1; MG/1
1 TABLET ORAL ONCE
Status: COMPLETED | OUTPATIENT
Start: 2021-07-13 | End: 2021-07-13

## 2021-07-13 RX ORDER — METHYLPREDNISOLONE 4 MG/1
TABLET ORAL
Qty: 1 KIT | Refills: 0 | Status: SHIPPED | OUTPATIENT
Start: 2021-07-13 | End: 2021-07-19

## 2021-07-13 RX ORDER — LIDOCAINE 50 MG/G
1 PATCH TOPICAL DAILY
Qty: 10 PATCH | Refills: 0 | Status: SHIPPED | OUTPATIENT
Start: 2021-07-13 | End: 2021-07-23

## 2021-07-13 RX ORDER — DEXAMETHASONE SODIUM PHOSPHATE 10 MG/ML
8 INJECTION, SOLUTION INTRAMUSCULAR; INTRAVENOUS ONCE
Status: COMPLETED | OUTPATIENT
Start: 2021-07-13 | End: 2021-07-13

## 2021-07-13 RX ORDER — KETOROLAC TROMETHAMINE 30 MG/ML
30 INJECTION, SOLUTION INTRAMUSCULAR; INTRAVENOUS ONCE
Status: COMPLETED | OUTPATIENT
Start: 2021-07-13 | End: 2021-07-13

## 2021-07-13 RX ORDER — ORPHENADRINE CITRATE 30 MG/ML
60 INJECTION INTRAMUSCULAR; INTRAVENOUS ONCE
Status: COMPLETED | OUTPATIENT
Start: 2021-07-13 | End: 2021-07-13

## 2021-07-13 RX ADMIN — ORPHENADRINE CITRATE 60 MG: 60 INJECTION INTRAMUSCULAR; INTRAVENOUS at 17:30

## 2021-07-13 RX ADMIN — DEXAMETHASONE SODIUM PHOSPHATE 8 MG: 10 INJECTION, SOLUTION INTRAMUSCULAR; INTRAVENOUS at 15:05

## 2021-07-13 RX ADMIN — OXYCODONE HYDROCHLORIDE AND ACETAMINOPHEN 1 TABLET: 5; 325 TABLET ORAL at 15:04

## 2021-07-13 RX ADMIN — KETOROLAC TROMETHAMINE 30 MG: 30 INJECTION, SOLUTION INTRAMUSCULAR; INTRAVENOUS at 15:52

## 2021-07-13 ASSESSMENT — PAIN DESCRIPTION - LOCATION: LOCATION: NECK

## 2021-07-13 ASSESSMENT — PAIN SCALES - GENERAL
PAINLEVEL_OUTOF10: 10

## 2021-07-13 ASSESSMENT — PAIN DESCRIPTION - DESCRIPTORS: DESCRIPTORS: SHOOTING;SHARP

## 2021-07-13 ASSESSMENT — PAIN DESCRIPTION - FREQUENCY: FREQUENCY: INTERMITTENT

## 2021-07-13 NOTE — ED NOTES
Radiology Procedure Waiver   Name: Shaila Quinonez  : 1985  MRN: [id number]    Date:  21    Time: 4:07 PM EDT    Benefits of immediately proceeding with Radiology exam(s) without pre-testing outweigh the risks or are not indicated as specified below and therefore the following is/are being waived:    [] Pregnancy test   [] Patients LMP on-time and regular.   [] Patient had Tubal Ligation or has other Contraception Device. [] Patient  is Menopausal or Premenarcheal.    [x] Patient had Full or Partial Hysterectomy. [] Protocol for Iodine allergy    [] MRI Questionnaire     [] BUN/Creatinine   [] Patient age w/no hx of renal dysfunction. [] Patient on Dialysis. [] Recent Normal Labs.   Electronically signed by DIONNE Beatty CNP on 21 at 4:07 PM EDT             DIONNE Beatty CNP  [id number]

## 2021-07-14 NOTE — ED PROVIDER NOTES
cervical biopsy, Headache, Hypertension, Placenta previa, Pregnancy induced hypertension, and Thyroid disease. Surgical History:  has a past surgical history that includes Ovary removal; [place] tooth extraction; Tubal ligation (2013); Hysterectomy (2013); and hernia repair. Social History:  reports that she quit smoking about 5 years ago. Her smoking use included cigarettes. She smoked 0.00 packs per day. She has never used smokeless tobacco. She reports that she does not drink alcohol and does not use drugs. Family History: family history includes High Blood Pressure in her mother. Allergies: Aspirin and Nsaids    Physical Exam   Oxygen Saturation Interpretation: Normal.        ED Triage Vitals [07/13/21 1445]   BP Temp Temp Source Pulse Resp SpO2 Height Weight   123/83 98 °F (36.7 °C) Temporal 72 18 98 % 4' 11\" (1.499 m) 150 lb (68 kg)         Constitutional:  Alert, development consistent with age. HEENT:  NC/NT. Airway patent. Neck:  In collar: No.          Bony tenderness:  none. Paraspinal tenderness:  mid and lower to the right. Trapezius Tenderness:  mild to the right. Crepitance: none. Step off: No.        Tracheal deviation: none. JVD: none. ROM: diminished range with pain. Skin:  no wounds, erythema, or swelling. Chest:  Symmetrical without visible rash or tenderness. Respiratory:  Clear to auscultation and breath sounds equal.  CV:  Regular rate and rhythm, normal heart sounds, without pathological murmurs. GI:  Abdomen Soft, nontender. No abrasions, ecchymoses, or lacerations. Back:  No costovertebral, paravertebral, intervertebral, or vertebral tenderness or spasm. Pelvis: non tender and stable to palpation. Integument:  No abrasions, ecchymoses, or lacerations unless noted elsewhere. Musculoskeletal: Right shoulder: Mild tenderness noted diffusely to right shoulder. Diminished range of motion due to pain.   Full range of aggravated by any movement and palpation. Right shoulder imaging negative for any acute osseous abnormalities or malalignment. CT thoracic spine negative for any acute findings. CT cervical spine showed mild degenerative changes at C5-C6. Right upper extremity had no neurovascular deficits. No sensory deficits. Compartments soft and compressible. Pain most likely muscular skeletal in nature/torticollis. No signs or concerns for cauda equina. She was placed in a sling and given specific instructions to use extremely sparingly as she does not want to cause a frozen shoulder. She verbalized understanding. She was medicated with Decadron, Percocet, Toradol, and Norflex while in the ED. She showed some improvement. Plan is for pain control and appropriate follow-up. She verbalized understanding agrees with plan. She was instructed to stop Flexeril and begin Valium. No history of DM. Prescribed Medrol Dosepak. She remained hemodynamically stable, nontoxic, and appropriate for outpatient management. Advised to return for any new, change, worsening symptoms or concerns. At this time the patient is without objective evidence of an acute process requiring hospitalization or inpatient management. They have remained hemodynamically stable throughout their entire ED visit and are stable for discharge with outpatient follow-up. The plan has been discussed in detail and they are aware of the specific conditions for emergent return, as well as the importance of follow-up. Plan of Care/Counseling:  DIONNE Ambrocio CNP reviewed today's visit with the patient in addition to providing specific details for the plan of care and counseling regarding the diagnosis and prognosis. Questions are answered at this time and are agreeable with the plan. Assessment     1. Torticollis    2. Neck pain    3. Acute pain of right shoulder    4.  Osteoarthritis of spine with radiculopathy, cervical region      Plan

## 2021-08-03 ENCOUNTER — HOSPITAL ENCOUNTER (OUTPATIENT)
Age: 36
Discharge: HOME | End: 2021-08-03
Payer: COMMERCIAL

## 2021-08-03 ENCOUNTER — TELEPHONE (OUTPATIENT)
Dept: ENDOCRINOLOGY | Age: 36
End: 2021-08-03

## 2021-08-03 DIAGNOSIS — E04.2 MULTINODULAR GOITER: ICD-10-CM

## 2021-08-03 DIAGNOSIS — E55.9 VITAMIN D DEFICIENCY: ICD-10-CM

## 2021-08-03 DIAGNOSIS — E05.90 HYPERTHYROIDISM: ICD-10-CM

## 2021-08-03 DIAGNOSIS — E05.90 HYPERTHYROIDISM: Primary | ICD-10-CM

## 2021-08-03 LAB
ANION GAP SERPL CALCULATED.3IONS-SCNC: 11 MMOL/L (ref 7–16)
BUN BLDV-MCNC: 7 MG/DL (ref 6–20)
CALCIUM SERPL-MCNC: 9.1 MG/DL (ref 8.6–10.2)
CHLORIDE BLD-SCNC: 107 MMOL/L (ref 98–107)
CO2: 23 MMOL/L (ref 22–29)
CREAT SERPL-MCNC: 0.7 MG/DL (ref 0.5–1)
GFR AFRICAN AMERICAN: >60
GFR NON-AFRICAN AMERICAN: >60 ML/MIN/1.73
GLUCOSE BLD-MCNC: 116 MG/DL (ref 74–99)
POTASSIUM SERPL-SCNC: 3.7 MMOL/L (ref 3.5–5)
SODIUM BLD-SCNC: 141 MMOL/L (ref 132–146)
T4 FREE: 1.18 NG/DL (ref 0.93–1.7)
TSH SERPL DL<=0.05 MIU/L-ACNC: 0.3 UIU/ML (ref 0.27–4.2)

## 2021-08-03 PROCEDURE — 82306 VITAMIN D 25 HYDROXY: CPT

## 2021-08-03 PROCEDURE — 84439 ASSAY OF FREE THYROXINE: CPT

## 2021-08-03 PROCEDURE — 36415 COLL VENOUS BLD VENIPUNCTURE: CPT

## 2021-08-03 PROCEDURE — 84443 ASSAY THYROID STIM HORMONE: CPT

## 2021-08-03 PROCEDURE — 80048 BASIC METABOLIC PNL TOTAL CA: CPT

## 2021-08-04 LAB — VITAMIN D 25-HYDROXY: 21 NG/ML (ref 30–100)

## 2021-08-04 NOTE — TELEPHONE ENCOUNTER
Notify pt,  I have reviewed your recent results    Great!, thyroid hormones results are within an acceptable range of normal. Will continue monitoring     Repeat labs again 11/2021

## 2021-10-12 ENCOUNTER — OFFICE VISIT (OUTPATIENT)
Dept: ENDOCRINOLOGY | Age: 36
End: 2021-10-12
Payer: COMMERCIAL

## 2021-10-12 VITALS
WEIGHT: 144 LBS | SYSTOLIC BLOOD PRESSURE: 127 MMHG | OXYGEN SATURATION: 99 % | HEART RATE: 58 BPM | RESPIRATION RATE: 18 BRPM | HEIGHT: 71 IN | BODY MASS INDEX: 20.16 KG/M2 | DIASTOLIC BLOOD PRESSURE: 86 MMHG

## 2021-10-12 DIAGNOSIS — E55.9 VITAMIN D DEFICIENCY: ICD-10-CM

## 2021-10-12 DIAGNOSIS — E04.2 MULTINODULAR GOITER: ICD-10-CM

## 2021-10-12 DIAGNOSIS — E05.90 HYPERTHYROIDISM: Primary | ICD-10-CM

## 2021-10-12 PROCEDURE — 99214 OFFICE O/P EST MOD 30 MIN: CPT | Performed by: INTERNAL MEDICINE

## 2021-10-12 PROCEDURE — G8427 DOCREV CUR MEDS BY ELIG CLIN: HCPCS | Performed by: INTERNAL MEDICINE

## 2021-10-12 PROCEDURE — G8420 CALC BMI NORM PARAMETERS: HCPCS | Performed by: INTERNAL MEDICINE

## 2021-10-12 PROCEDURE — G8484 FLU IMMUNIZE NO ADMIN: HCPCS | Performed by: INTERNAL MEDICINE

## 2021-10-12 PROCEDURE — 1036F TOBACCO NON-USER: CPT | Performed by: INTERNAL MEDICINE

## 2021-10-12 NOTE — PROGRESS NOTES
cigarettes. She smoked 0.00 packs per day. She has never used smokeless tobacco.  Alcohol:   reports no history of alcohol use. Drugs:   reports no history of drug use. FAMILY HISTORY   Family History   Problem Relation Age of Onset    High Blood Pressure Mother        ALLERGIES AND DRUG REACTIONS   Allergies   Allergen Reactions    Aspirin      Other reaction(s): Vomiting    Nsaids Nausea And Vomiting       CURRENT MEDICATIONS   Current Outpatient Medications   Medication Sig Dispense Refill    estradiol (CLIMARA) 0.1 MG/24HR       hydroCHLOROthiazide (HYDRODIURIL) 25 MG tablet Take 25 mg by mouth daily      metoprolol succinate (TOPROL XL) 25 MG extended release tablet Take 25 mg by mouth daily      naproxen (NAPROSYN) 500 MG tablet Take 1 tablet by mouth 2 times daily for 10 days 20 tablet 0     No current facility-administered medications for this visit. Review of Systems  Constitutional: No fever, no chills, no diaphoresis, no generalized weakness. HEENT: No blurred vision, No sore throat, no ear pain, no hair loss  Neck: denied any neck swelling, difficulty swallowing,   Cadrdiopulomary: No CP, SOB or palpitation, No orthopnea or PND. No cough or wheezing. GI: No N/V/D, no constipation, No abdominal pain, no melena or hematochezia   : Denied any dysuria, hematuria, flank pain, discharge, or incontinence. Skin: denied any rash, ulcer, Hirsute, or hyperpigmentation. MSK: denied any joint deformity, joint pain/swelling, muscle pain, or back pain.   Neuro: no numbess, no tingling, no weakness,     OBJECTIVE    /86   Pulse 58   Resp 18   Ht 5' 11\" (1.803 m)   Wt 144 lb (65.3 kg)   LMP 05/11/2013   SpO2 99%   BMI 20.08 kg/m²   BP Readings from Last 4 Encounters:   10/12/21 127/86   07/13/21 123/83   07/11/21 (!) 140/96   07/08/21 130/78     Wt Readings from Last 6 Encounters:   10/12/21 144 lb (65.3 kg)   07/13/21 150 lb (68 kg)   07/11/21 150 lb (68 kg)   07/08/21 150 lb (68 kg) 03/12/21 152 lb (68.9 kg)   01/19/21 152 lb (68.9 kg)     Physical examination:  General: awake alert, oriented x3  HEENT: normocephalic non traumatic, no exophthalmos   Neck: supple, thyroid tenderness,  Pulm: Clear equal air entry no added sounds  CVS: S1 + S2  Abd: soft lax, no tenderness  Skin: warm, no lesions, no rash   Neuro: CN intact,  muscle power normal  Psych: normal mood, and affect    Review of Laboratory Data:  I have reviewed the following:  Lab Results   Component Value Date/Time    WBC 7.9 02/15/2017 05:30 PM    RBC 5.17 02/15/2017 05:30 PM    HGB 16.2 (H) 02/15/2017 05:30 PM    HCT 47.9 02/15/2017 05:30 PM    MCV 92.6 02/15/2017 05:30 PM    MCH 31.3 02/15/2017 05:30 PM    MCHC 33.8 02/15/2017 05:30 PM    RDW 12.0 02/15/2017 05:30 PM     02/15/2017 05:30 PM    MPV 8.8 02/15/2017 05:30 PM      Lab Results   Component Value Date/Time     08/03/2021 12:40 PM    K 3.7 08/03/2021 12:40 PM    CO2 23 08/03/2021 12:40 PM    BUN 7 08/03/2021 12:40 PM    CREATININE 0.7 08/03/2021 12:40 PM    CALCIUM 9.1 08/03/2021 12:40 PM    LABGLOM >60 08/03/2021 12:40 PM    GFRAA >60 08/03/2021 12:40 PM      Lab Results   Component Value Date/Time    TSH 0.304 08/03/2021 12:40 PM    T4FREE 1.18 08/03/2021 12:40 PM    Y7EQQJM 8.1 10/08/2020 03:34 PM    G8JQQZL 111.90 06/18/2020 02:32 PM    TSI <0.10 06/18/2020 02:32 PM    TPOABS 3.1 06/18/2020 02:32 PM    THGAB <0.9 06/18/2020 02:32 PM     Lab Results   Component Value Date    GLUCOSE 116 08/03/2021    GLUCOSE 73 04/27/2012    LABCREA 10 09/21/2018     No results found for: TRIG, HDL, LDLCALC, CHOL  Lab Results   Component Value Date    VITD25 21 08/03/2021    VITD25 26 06/18/2020     ASSESSMENT & RECOMMENDATIONS   [place], a 43 y.o.-old female seen in for management of following issues      Previous Hyperthyroidism  · Resolved, Likely due to Thyroiditis   · Previous work up was negative for toxic nodule(s) and graves disease   · Obtain TSH, free T4 Again in 6 months     H/o MNG   · Thyroid US 6/2020 --> small 4 mm nodule   · Will continue monitoring with intermittent US   · US before next visit in a year     vitD deficiency    · Continue vitD supplement     I personally reviewed external notes from PCP and other patient's care team providers, and personally interpreted labs associated with the above diagnosis. I also ordered labs to further assess and manage the above addressed medical conditions    Return in about 1 year (around 10/12/2022) for Hyperthyroidism, Multinodular goiter, VitD deficiency. The above issues were reviewed with the patient who understood and agreed with the plan. Thank you for allowing us to participate in the care of this patient. Please do not hesitate to contact us with any additional questions. Diagnosis Orders   1. Hyperthyroidism  TSH without Reflex    T4, Free    US THYROID   2. Vitamin D deficiency  Vitamin D 25 Hydroxy    Basic Metabolic Panel   3. Multinodular goiter  US THYROID       Gela Oconnell MD  Endocrinologist, [facility] Diabetes Care and Endocrinology   [address] [facility] [address]   Phone: [phone]  Fax: [phone]  ---------------------------------  An electronic signature was used to authenticate this note.  Aries Castillo MD on 10/12/2021 at 10:20 AM

## 2021-12-14 ENCOUNTER — APPOINTMENT (OUTPATIENT)
Dept: GENERAL RADIOLOGY | Age: 36
End: 2021-12-14
Payer: COMMERCIAL

## 2021-12-14 ENCOUNTER — HOSPITAL ENCOUNTER (EMERGENCY)
Age: 36
Discharge: HOME | End: 2021-12-14
Attending: EMERGENCY MEDICINE
Payer: COMMERCIAL

## 2021-12-14 VITALS
OXYGEN SATURATION: 100 % | TEMPERATURE: 98 F | SYSTOLIC BLOOD PRESSURE: 122 MMHG | HEART RATE: 80 BPM | WEIGHT: 146 LBS | BODY MASS INDEX: 29.43 KG/M2 | HEIGHT: 59 IN | RESPIRATION RATE: 15 BRPM | DIASTOLIC BLOOD PRESSURE: 80 MMHG

## 2021-12-14 DIAGNOSIS — Z20.822 SUSPECTED COVID-19 VIRUS INFECTION: ICD-10-CM

## 2021-12-14 DIAGNOSIS — J01.90 ACUTE NON-RECURRENT SINUSITIS, UNSPECIFIED LOCATION: Primary | ICD-10-CM

## 2021-12-14 LAB
INFLUENZA A BY PCR: NOT DETECTED
INFLUENZA B BY PCR: NOT DETECTED

## 2021-12-14 PROCEDURE — U0005 INFEC AGEN DETEC AMPLI PROBE: HCPCS

## 2021-12-14 PROCEDURE — 99284 EMERGENCY DEPT VISIT MOD MDM: CPT

## 2021-12-14 PROCEDURE — U0003 INFECTIOUS AGENT DETECTION BY NUCLEIC ACID (DNA OR RNA); SEVERE ACUTE RESPIRATORY SYNDROME CORONAVIRUS 2 (SARS-COV-2) (CORONAVIRUS DISEASE [COVID-19]), AMPLIFIED PROBE TECHNIQUE, MAKING USE OF HIGH THROUGHPUT TECHNOLOGIES AS DESCRIBED BY CMS-2020-01-R: HCPCS

## 2021-12-14 PROCEDURE — 87502 INFLUENZA DNA AMP PROBE: CPT

## 2021-12-14 PROCEDURE — 71045 X-RAY EXAM CHEST 1 VIEW: CPT

## 2021-12-14 RX ORDER — AZITHROMYCIN 250 MG/1
TABLET, FILM COATED ORAL
Qty: 1 PACKET | Refills: 0 | Status: SHIPPED | OUTPATIENT
Start: 2021-12-14 | End: 2021-12-19

## 2021-12-14 RX ORDER — FLUTICASONE PROPIONATE 50 MCG
2 SPRAY, SUSPENSION (ML) NASAL DAILY
Qty: 16 G | Refills: 0 | Status: SHIPPED | OUTPATIENT
Start: 2021-12-14

## 2021-12-14 RX ORDER — GUAIFENESIN AND DEXTROMETHORPHAN HYDROBROMIDE 1200; 60 MG/1; MG/1
1 TABLET, EXTENDED RELEASE ORAL EVERY 12 HOURS PRN
Qty: 28 TABLET | Refills: 0 | Status: SHIPPED | OUTPATIENT
Start: 2021-12-14 | End: 2022-03-02

## 2021-12-14 ASSESSMENT — ENCOUNTER SYMPTOMS
COUGH: 1
SHORTNESS OF BREATH: 0
ABDOMINAL PAIN: 0
COLOR CHANGE: 0
SORE THROAT: 0
VOMITING: 0
NAUSEA: 0
SINUS PRESSURE: 1
BACK PAIN: 0
RHINORRHEA: 1
PHOTOPHOBIA: 0

## 2021-12-14 ASSESSMENT — PAIN DESCRIPTION - PAIN TYPE: TYPE: ACUTE PAIN

## 2021-12-14 ASSESSMENT — PAIN DESCRIPTION - FREQUENCY: FREQUENCY: CONTINUOUS

## 2021-12-14 ASSESSMENT — PAIN SCALES - GENERAL: PAINLEVEL_OUTOF10: 6

## 2021-12-14 ASSESSMENT — PAIN DESCRIPTION - DESCRIPTORS: DESCRIPTORS: HEADACHE

## 2021-12-14 ASSESSMENT — PAIN DESCRIPTION - LOCATION: LOCATION: HEAD

## 2021-12-14 NOTE — Clinical Note
Michelle Greene was seen and treated in our emergency department on 12/14/2021. She may return to work on 12/20/2021. If you have any questions or concerns, please don't hesitate to call.       Rj Green, DO

## 2021-12-14 NOTE — ED PROVIDER NOTES
Hamilton Dakin is a 43 y.o. female presenting to the ED for cough congestion sinus drainage and pressure, beginning pta ago. The complaint has been intermittent, moderate in severity, and worsened by nothing. Is a 41-year-old female who states she has only medical history of allergies. She states for the past couple days she has had cough congestion sinus pain sinus drainage and bilateral earache. She denies any shortness of breath or chest pain denies any abdominal pain vomiting diarrhea loss of taste or smell. Patient's not vaccinated for Covid. She notes she is generally tired. Patient denies any fever chills or sweats. Review of Systems:   Review of Systems   Constitutional: Positive for fatigue. Negative for fever and unexpected weight change. HENT: Positive for congestion, rhinorrhea and sinus pressure. Negative for sore throat. Eyes: Negative for photophobia and visual disturbance. Respiratory: Positive for cough. Negative for shortness of breath. Cardiovascular: Negative for chest pain, palpitations and leg swelling. Gastrointestinal: Negative for abdominal pain, nausea and vomiting. Endocrine: Negative for polyphagia and polyuria. Genitourinary: Negative for difficulty urinating, flank pain and frequency. Musculoskeletal: Negative for arthralgias and back pain. Skin: Negative for color change and rash. Allergic/Immunologic: Negative for food allergies and immunocompromised state. Neurological: Negative for dizziness, light-headedness and headaches. Hematological: Negative for adenopathy.    Psychiatric/Behavioral: Negative for agitation and confusion.                 --------------------------------------------- PAST HISTORY ---------------------------------------------  Past Medical History:  has a past medical history of Abnormal Pap smear, Back pain, Bronchitis, Chronic back pain, Endometriosis, H/O colposcopy with cervical biopsy, Headache, Hypertension, Placenta previa, Pregnancy induced hypertension, and Thyroid disease. Past Surgical History:  has a past surgical history that includes Ovary removal; [place] tooth extraction; Tubal ligation (2013); Hysterectomy (2013); and hernia repair. Social History:  reports that she quit smoking about 5 years ago. Her smoking use included cigarettes. She smoked 0.00 packs per day. She has never used smokeless tobacco. She reports that she does not drink alcohol and does not use drugs. Family History: family history includes High Blood Pressure in her mother. The patients home medications have been reviewed. Allergies: Aspirin and Nsaids    -------------------------------------------------- RESULTS -------------------------------------------------  All laboratory and radiology results have been personally reviewed by myself   LABS:  Results for orders placed or performed during the hospital encounter of 12/14/21   RAPID INFLUENZA A/B ANTIGENS    Specimen: Nasopharyngeal   Result Value Ref Range    Influenza A by PCR Not Detected Not Detected    Influenza B by PCR Not Detected Not Detected       RADIOLOGY:  Interpreted by Radiologist.  XR CHEST PORTABLE   Final Result   No acute process. ------------------------- NURSING NOTES AND VITALS REVIEWED ---------------------------   The nursing notes within the ED encounter and vital signs as below have been reviewed. /80   Pulse 80   Temp 98 °F (36.7 °C) (Temporal)   Resp 15   Ht 4' 11\" (1.499 m)   Wt 146 lb (66.2 kg)   LMP 05/11/2013   SpO2 100%   BMI 29.49 kg/m²   Oxygen Saturation Interpretation: Normal      ---------------------------------------------------PHYSICAL EXAM--------------------------------------    Physical Exam  Vitals reviewed. Constitutional:       General: She is not in acute distress. Appearance: Normal appearance. She is not toxic-appearing. HENT:      Head: Normocephalic and atraumatic.       Right Ear: External ear normal.      Left Ear: External ear normal.      Nose: Nose normal. No congestion. Mouth/Throat:      Mouth: Mucous membranes are moist.      Pharynx: Oropharynx is clear. No posterior oropharyngeal erythema. Eyes:      Extraocular Movements: Extraocular movements intact. Pupils: Pupils are equal, round, and reactive to light. Cardiovascular:      Rate and Rhythm: Normal rate and regular rhythm. Pulses: Normal pulses. Heart sounds: No murmur heard. Pulmonary:      Effort: Pulmonary effort is normal.      Breath sounds: No wheezing or rhonchi. Chest:      Chest wall: No tenderness. Abdominal:      General: Bowel sounds are normal.      Tenderness: There is no abdominal tenderness. There is no right CVA tenderness, left CVA tenderness or guarding. Musculoskeletal:         General: No swelling or deformity. Cervical back: Normal range of motion and neck supple. No muscular tenderness. Skin:     General: Skin is warm and dry. Capillary Refill: Capillary refill takes less than 2 seconds. Findings: No bruising. Neurological:      General: No focal deficit present. Mental Status: She is alert and oriented to person, place, and time. Motor: No weakness. Coordination: Coordination normal.   Psychiatric:         Mood and Affect: Mood normal.         Behavior: Behavior normal.               ------------------------------ ED COURSE/MEDICAL DECISION MAKING----------------------  Medications - No data to display    ED COURSE:       Medical Decision Making:   cxr negative, pt to be discharged w covid precautions f/u pcp in 2-3 days. Risks and benefits were discussed with patient for All medications dispensed and given in department as well prescriptions prescribed for home, . The patient elected to take the medicine. Pt instructed on warning signs and precautions for medication side effects.  The patient was given warning signs for when to seek

## 2021-12-15 LAB — SARS-COV-2, PCR: NOT DETECTED

## 2021-12-19 ENCOUNTER — HOSPITAL ENCOUNTER (EMERGENCY)
Age: 36
Discharge: HOME | End: 2021-12-19
Attending: FAMILY MEDICINE
Payer: COMMERCIAL

## 2021-12-19 ENCOUNTER — APPOINTMENT (OUTPATIENT)
Dept: CT IMAGING | Age: 36
End: 2021-12-19
Payer: COMMERCIAL

## 2021-12-19 VITALS
OXYGEN SATURATION: 97 % | DIASTOLIC BLOOD PRESSURE: 86 MMHG | RESPIRATION RATE: 18 BRPM | SYSTOLIC BLOOD PRESSURE: 120 MMHG | HEART RATE: 92 BPM | TEMPERATURE: 99.5 F

## 2021-12-19 DIAGNOSIS — J01.00 ACUTE MAXILLARY SINUSITIS, RECURRENCE NOT SPECIFIED: Primary | ICD-10-CM

## 2021-12-19 PROCEDURE — 99283 EMERGENCY DEPT VISIT LOW MDM: CPT

## 2021-12-19 PROCEDURE — 70486 CT MAXILLOFACIAL W/O DYE: CPT

## 2021-12-19 RX ORDER — ACETAMINOPHEN 500 MG
1000 TABLET ORAL EVERY 8 HOURS PRN
Qty: 50 TABLET | Refills: 0 | Status: SHIPPED | OUTPATIENT
Start: 2021-12-19

## 2021-12-19 RX ORDER — CEFDINIR 300 MG/1
300 CAPSULE ORAL 2 TIMES DAILY
Qty: 20 CAPSULE | Refills: 0 | Status: SHIPPED | OUTPATIENT
Start: 2021-12-19 | End: 2021-12-29

## 2021-12-19 ASSESSMENT — PAIN DESCRIPTION - ONSET: ONSET: ON-GOING

## 2021-12-19 ASSESSMENT — PAIN DESCRIPTION - FREQUENCY: FREQUENCY: CONTINUOUS

## 2021-12-19 ASSESSMENT — PAIN DESCRIPTION - LOCATION: LOCATION: FACE

## 2021-12-19 ASSESSMENT — PAIN SCALES - GENERAL: PAINLEVEL_OUTOF10: 10

## 2021-12-19 ASSESSMENT — PAIN DESCRIPTION - PAIN TYPE: TYPE: ACUTE PAIN

## 2021-12-19 ASSESSMENT — PAIN DESCRIPTION - PROGRESSION: CLINICAL_PROGRESSION: GRADUALLY WORSENING

## 2021-12-19 ASSESSMENT — PAIN DESCRIPTION - DESCRIPTORS: DESCRIPTORS: PRESSURE

## 2021-12-19 NOTE — ED PROVIDER NOTES
HPI:  12/19/21,   Time: 12:52 PM LI Chamberlain is a 43 y.o. female presenting to the ED for continuing sinus pain green discharge antibiotics not helping, with seen here 5 days ago. Had Covid test which was negative and was treated with azithromycin denies any neck stiffness any fever chills any headache. The complaint has been persistent, moderate in severity, and worsened by nothing. ROS:   Pertinent positives and negatives are stated within HPI, all other systems reviewed and are negative.  --------------------------------------------- PAST HISTORY ---------------------------------------------  Past Medical History:  has a past medical history of Abnormal Pap smear, Back pain, Bronchitis, Chronic back pain, Endometriosis, H/O colposcopy with cervical biopsy, Headache, Hypertension, Placenta previa, Pregnancy induced hypertension, and Thyroid disease. Past Surgical History:  has a past surgical history that includes Ovary removal; [place] tooth extraction; Tubal ligation (2013); Hysterectomy (2013); and hernia repair. Social History:  reports that she quit smoking about 5 years ago. Her smoking use included cigarettes. She smoked 0.00 packs per day. She has never used smokeless tobacco. She reports that she does not drink alcohol and does not use drugs. Family History: family history includes High Blood Pressure in her mother. The patients home medications have been reviewed. Allergies: Aspirin and Nsaids    -------------------------------------------------- RESULTS -------------------------------------------------  All laboratory and radiology results have been personally reviewed by myself   LABS:  No results found for this visit on 12/19/21. RADIOLOGY:  Interpreted by Radiologist.  [address]   Final Result   Nonspecific thickening of left-sided terminates.              ------------------------- NURSING NOTES AND VITALS REVIEWED ---------------------------   The nursing notes within the ED encounter and vital signs as below have been reviewed. /86   Pulse 92   Temp 99.5 °F (37.5 °C) (Temporal)   Resp 18   LMP 05/11/2013   SpO2 97%   Oxygen Saturation Interpretation: Normal      ---------------------------------------------------PHYSICAL EXAM--------------------------------------      Constitutional/General: Alert and oriented x3, well appearing, non toxic in NAD  Head: NC/AT there is maxillary and frontal sinus tenderness  Eyes: PERRL, EOMI  Nose purulent discharge  Throat: Mild erythema no exudate  Mouth: Oropharynx clear, handling secretions, no trismus  Neck: Supple, full ROM, no meningeal signs  Pulmonary: Lungs clear to auscultation bilaterally, no wheezes, rales, or rhonchi. Not in respiratory distress  Cardiovascular:  Regular rate and rhythm, no murmurs, gallops, or rubs. 2+ distal pulses  Abdomen: Soft, non tender, non distended,   Extremities: Moves all extremities x 4. Warm and well perfused  Skin: warm and dry without rash  Neurologic: GCS 15,  Psych: Normal Affect      ------------------------------ ED COURSE/MEDICAL DECISION MAKING----------------------  Medications - No data to display      Medical Decision Making:    Patient is nontoxic not tachypneic tachycardic not hypoxic CT together with exam is more consistent with sinusitis will treat and follow-up with PMD return. Counseling: The emergency provider has spoken with the patient and discussed todays results, in addition to providing specific details for the plan of care and counseling regarding the diagnosis and prognosis. Questions are answered at this time and they are agreeable with the plan.      --------------------------------- IMPRESSION AND DISPOSITION ---------------------------------    IMPRESSION  1.  Acute maxillary sinusitis, recurrence not specified        DISPOSITION  Disposition: Discharge to home  Patient condition is good                  Dk Lott, MD  12/19/21 0533

## 2021-12-19 NOTE — Clinical Note
Graeme Jennings was seen and treated in our emergency department on 12/19/2021. She may return to work on 12/21/2021. If you have any questions or concerns, please don't hesitate to call.       Cong Cheung MD

## 2021-12-21 ENCOUNTER — APPOINTMENT (OUTPATIENT)
Dept: CT IMAGING | Age: 36
End: 2021-12-21
Payer: COMMERCIAL

## 2021-12-21 ENCOUNTER — HOSPITAL ENCOUNTER (EMERGENCY)
Age: 36
Discharge: HOME | End: 2021-12-21
Payer: COMMERCIAL

## 2021-12-21 VITALS
SYSTOLIC BLOOD PRESSURE: 158 MMHG | BODY MASS INDEX: 27.67 KG/M2 | TEMPERATURE: 97.8 F | WEIGHT: 137 LBS | OXYGEN SATURATION: 99 % | HEART RATE: 98 BPM | RESPIRATION RATE: 16 BRPM | DIASTOLIC BLOOD PRESSURE: 96 MMHG

## 2021-12-21 DIAGNOSIS — R10.9 LEFT FLANK PAIN: Primary | ICD-10-CM

## 2021-12-21 DIAGNOSIS — N23 RENAL COLIC: ICD-10-CM

## 2021-12-21 LAB
ALBUMIN SERPL-MCNC: 4.4 G/DL (ref 3.5–5.2)
ALP BLD-CCNC: 66 U/L (ref 35–104)
ALT SERPL-CCNC: 22 U/L (ref 0–32)
ANION GAP SERPL CALCULATED.3IONS-SCNC: 11 MMOL/L (ref 7–16)
AST SERPL-CCNC: 21 U/L (ref 0–31)
BACTERIA: ABNORMAL /HPF
BASOPHILS ABSOLUTE: 0.01 E9/L (ref 0–0.2)
BASOPHILS RELATIVE PERCENT: 0.2 % (ref 0–2)
BILIRUB SERPL-MCNC: 0.2 MG/DL (ref 0–1.2)
BILIRUBIN URINE: NEGATIVE
BLOOD, URINE: NEGATIVE
BUN BLDV-MCNC: 11 MG/DL (ref 6–20)
CALCIUM SERPL-MCNC: 9.3 MG/DL (ref 8.6–10.2)
CHLORIDE BLD-SCNC: 103 MMOL/L (ref 98–107)
CLARITY: ABNORMAL
CO2: 25 MMOL/L (ref 22–29)
COLOR: YELLOW
CREAT SERPL-MCNC: 0.7 MG/DL (ref 0.5–1)
EOSINOPHILS ABSOLUTE: 0.02 E9/L (ref 0.05–0.5)
EOSINOPHILS RELATIVE PERCENT: 0.5 % (ref 0–6)
EPITHELIAL CELLS, UA: ABNORMAL /HPF
GFR AFRICAN AMERICAN: >60
GFR NON-AFRICAN AMERICAN: >60 ML/MIN/1.73
GLUCOSE BLD-MCNC: 91 MG/DL (ref 74–99)
GLUCOSE URINE: NEGATIVE MG/DL
HCT VFR BLD CALC: 41.8 % (ref 34–48)
HEMOGLOBIN: 14.4 G/DL (ref 11.5–15.5)
IMMATURE GRANULOCYTES #: 0.01 E9/L
IMMATURE GRANULOCYTES %: 0.2 % (ref 0–5)
KETONES, URINE: 15 MG/DL
LEUKOCYTE ESTERASE, URINE: NEGATIVE
LYMPHOCYTES ABSOLUTE: 2.35 E9/L (ref 1.5–4)
LYMPHOCYTES RELATIVE PERCENT: 53.5 % (ref 20–42)
MCH RBC QN AUTO: 31.3 PG (ref 26–35)
MCHC RBC AUTO-ENTMCNC: 34.4 % (ref 32–34.5)
MCV RBC AUTO: 90.9 FL (ref 80–99.9)
MONOCYTES ABSOLUTE: 0.51 E9/L (ref 0.1–0.95)
MONOCYTES RELATIVE PERCENT: 11.6 % (ref 2–12)
NEUTROPHILS ABSOLUTE: 1.49 E9/L (ref 1.8–7.3)
NEUTROPHILS RELATIVE PERCENT: 34 % (ref 43–80)
NITRITE, URINE: NEGATIVE
PDW BLD-RTO: 12.9 FL (ref 11.5–15)
PH UA: 7 (ref 5–9)
PLATELET # BLD: 233 E9/L (ref 130–450)
PMV BLD AUTO: 8.8 FL (ref 7–12)
POTASSIUM REFLEX MAGNESIUM: 3.7 MMOL/L (ref 3.5–5)
PROTEIN UA: ABNORMAL MG/DL
RBC # BLD: 4.6 E12/L (ref 3.5–5.5)
RBC UA: ABNORMAL /HPF (ref 0–2)
SODIUM BLD-SCNC: 139 MMOL/L (ref 132–146)
SPECIFIC GRAVITY UA: 1.02 (ref 1–1.03)
TOTAL PROTEIN: 7.5 G/DL (ref 6.4–8.3)
UROBILINOGEN, URINE: 2 E.U./DL
WBC # BLD: 4.4 E9/L (ref 4.5–11.5)
WBC UA: ABNORMAL /HPF (ref 0–5)

## 2021-12-21 PROCEDURE — 85025 COMPLETE CBC W/AUTO DIFF WBC: CPT

## 2021-12-21 PROCEDURE — 80053 COMPREHEN METABOLIC PANEL: CPT

## 2021-12-21 PROCEDURE — 99284 EMERGENCY DEPT VISIT MOD MDM: CPT

## 2021-12-21 PROCEDURE — 2580000003 HC RX 258: Performed by: PHYSICIAN ASSISTANT

## 2021-12-21 PROCEDURE — 81001 URINALYSIS AUTO W/SCOPE: CPT

## 2021-12-21 PROCEDURE — 96374 THER/PROPH/DIAG INJ IV PUSH: CPT

## 2021-12-21 PROCEDURE — 74176 CT ABD & PELVIS W/O CONTRAST: CPT

## 2021-12-21 PROCEDURE — 87088 URINE BACTERIA CULTURE: CPT

## 2021-12-21 PROCEDURE — 6360000002 HC RX W HCPCS: Performed by: PHYSICIAN ASSISTANT

## 2021-12-21 PROCEDURE — 36415 COLL VENOUS BLD VENIPUNCTURE: CPT

## 2021-12-21 RX ORDER — 0.9 % SODIUM CHLORIDE 0.9 %
1000 INTRAVENOUS SOLUTION INTRAVENOUS ONCE
Status: COMPLETED | OUTPATIENT
Start: 2021-12-21 | End: 2021-12-21

## 2021-12-21 RX ORDER — IBUPROFEN 600 MG/1
600 TABLET ORAL 3 TIMES DAILY PRN
Qty: 30 TABLET | Refills: 0 | Status: SHIPPED | OUTPATIENT
Start: 2021-12-21 | End: 2022-03-02

## 2021-12-21 RX ORDER — KETOROLAC TROMETHAMINE 30 MG/ML
15 INJECTION, SOLUTION INTRAMUSCULAR; INTRAVENOUS ONCE
Status: COMPLETED | OUTPATIENT
Start: 2021-12-21 | End: 2021-12-21

## 2021-12-21 RX ORDER — CYCLOBENZAPRINE HCL 10 MG
10 TABLET ORAL 3 TIMES DAILY PRN
Qty: 21 TABLET | Refills: 0 | Status: SHIPPED | OUTPATIENT
Start: 2021-12-21 | End: 2021-12-31

## 2021-12-21 RX ADMIN — SODIUM CHLORIDE 1000 ML: 9 INJECTION, SOLUTION INTRAVENOUS at 16:19

## 2021-12-21 RX ADMIN — KETOROLAC TROMETHAMINE 15 MG: 30 INJECTION, SOLUTION INTRAMUSCULAR; INTRAVENOUS at 16:19

## 2021-12-21 ASSESSMENT — ENCOUNTER SYMPTOMS
VOMITING: 0
COLOR CHANGE: 0
SINUS PRESSURE: 0
CHEST TIGHTNESS: 0
DIARRHEA: 0
FACIAL SWELLING: 0
ABDOMINAL PAIN: 1
COUGH: 0
NAUSEA: 0
BACK PAIN: 0
SINUS PAIN: 0
SORE THROAT: 0
TROUBLE SWALLOWING: 0
SHORTNESS OF BREATH: 0
CONSTIPATION: 0

## 2021-12-21 ASSESSMENT — PAIN DESCRIPTION - ORIENTATION: ORIENTATION: LEFT

## 2021-12-21 ASSESSMENT — PAIN SCALES - GENERAL
PAINLEVEL_OUTOF10: 10
PAINLEVEL_OUTOF10: 10

## 2021-12-21 ASSESSMENT — PAIN DESCRIPTION - DESCRIPTORS: DESCRIPTORS: CONSTANT;SHARP

## 2021-12-21 ASSESSMENT — PAIN DESCRIPTION - LOCATION: LOCATION: FLANK

## 2021-12-21 ASSESSMENT — PAIN DESCRIPTION - FREQUENCY: FREQUENCY: CONTINUOUS

## 2021-12-21 ASSESSMENT — PAIN DESCRIPTION - PAIN TYPE: TYPE: ACUTE PAIN

## 2021-12-21 NOTE — ED PROVIDER NOTES
[facility]        Department of Emergency Medicine   ED  Provider Note  Admit Date/RoomTime: 12/21/2021  2:57 PM  ED Room: 07/07  HPI:  12/21/21, Time: 4:10 PM EST      The patient is a 44 yo female with a history of kidney stones, HTN, thyroid disease presenting to the ED with left flank pain that started yesterday. Patient states she started feeling like she was having difficulty urinating yesterday and then began having left flank pain last night. Patient states she feels like she may have passed something when urinating this morning. She states she \"felt resistant. \"  She has since then been having a sharp aching pain in her left side that is radiating into her abdomen. She states it feels like when she had kidney stones in the past.  Patient is currently on an antibiotic for sinus infection. She has not had any fevers or chills, nausea/vomiting, blood in the urine, pelvic pain, concern for STD. The patient has not taken anything for her pain. The history is provided by the patient. No  was used. REVIEW OF SYSTEMS:  Review of Systems   Constitutional: Negative for activity change, chills, fatigue and fever. HENT: Negative for congestion, ear pain, facial swelling, sinus pressure, sinus pain, sore throat and trouble swallowing. Respiratory: Negative for cough, chest tightness and shortness of breath. Cardiovascular: Negative for chest pain, palpitations and leg swelling. Gastrointestinal: Positive for abdominal pain. Negative for constipation, diarrhea, nausea and vomiting. Genitourinary: Positive for difficulty urinating, dysuria and flank pain. Negative for frequency and hematuria. Musculoskeletal: Negative for back pain, neck pain and neck stiffness. Skin: Negative for color change, pallor and rash. Neurological: Negative for dizziness, weakness, light-headedness and headaches.    Psychiatric/Behavioral: Negative for agitation, behavioral problems and confusion. Pertinent positives and negatives are stated within HPI, all other systems reviewed and are negative.      --------------------------------------------- PAST HISTORY ---------------------------------------------  Past Medical History:  has a past medical history of Abnormal Pap smear, Back pain, Bronchitis, Chronic back pain, Endometriosis, H/O colposcopy with cervical biopsy, Headache, Hypertension, Placenta previa, Pregnancy induced hypertension, and Thyroid disease. Past Surgical History:  has a past surgical history that includes Ovary removal; [place] tooth extraction; Tubal ligation (2013); Hysterectomy (2013); and hernia repair. Social History:  reports that she quit smoking about 5 years ago. Her smoking use included cigarettes. She smoked 0.00 packs per day. She has never used smokeless tobacco. She reports that she does not drink alcohol and does not use drugs. Family History: family history includes High Blood Pressure in her mother. The patients home medications have been reviewed.     Allergies: Aspirin and Nsaids    -------------------------------------------------- RESULTS -------------------------------------------------  All laboratory and radiology results have been personally reviewed by myself   LABS:  Results for orders placed or performed during the hospital encounter of 12/21/21   Urinalysis with Microscopic   Result Value Ref Range    Color, UA Yellow Straw/Yellow    Clarity, UA CLOUDY (A) Clear    Glucose, Ur Negative Negative mg/dL    Bilirubin Urine Negative Negative    Ketones, Urine 15 (A) Negative mg/dL    Specific Gravity, UA 1.025 1.005 - 1.030    Blood, Urine Negative Negative    pH, UA 7.0 5.0 - 9.0    Protein, UA TRACE Negative mg/dL    Urobilinogen, Urine 2.0 (A) <2.0 E.U./dL    Nitrite, Urine Negative Negative    Leukocyte Esterase, Urine Negative Negative    WBC, UA 1-3 0 - 5 /HPF    RBC, UA NONE 0 - 2 /HPF    Epithelial Cells, UA MODERATE /HPF Bacteria, UA MODERATE (A) None Seen /HPF   CBC auto differential   Result Value Ref Range    WBC 4.4 (L) 4.5 - 11.5 E9/L    RBC 4.60 3.50 - 5.50 E12/L    Hemoglobin 14.4 11.5 - 15.5 g/dL    Hematocrit 41.8 34.0 - 48.0 %    MCV 90.9 80.0 - 99.9 fL    MCH 31.3 26.0 - 35.0 pg    MCHC 34.4 32.0 - 34.5 %    RDW 12.9 11.5 - 15.0 fL    Platelets 799 729 - 853 E9/L    MPV 8.8 7.0 - 12.0 fL    Neutrophils % 34.0 (L) 43.0 - 80.0 %    Immature Granulocytes % 0.2 0.0 - 5.0 %    Lymphocytes % 53.5 (H) 20.0 - 42.0 %    Monocytes % 11.6 2.0 - 12.0 %    Eosinophils % 0.5 0.0 - 6.0 %    Basophils % 0.2 0.0 - 2.0 %    Neutrophils Absolute 1.49 (L) 1.80 - 7.30 E9/L    Immature Granulocytes # 0.01 E9/L    Lymphocytes Absolute 2.35 1.50 - 4.00 E9/L    Monocytes Absolute 0.51 0.10 - 0.95 E9/L    Eosinophils Absolute 0.02 (L) 0.05 - 0.50 E9/L    Basophils Absolute 0.01 0.00 - 0.20 E9/L   Comprehensive Metabolic Panel w/ Reflex to MG   Result Value Ref Range    Sodium 139 132 - 146 mmol/L    Potassium reflex Magnesium 3.7 3.5 - 5.0 mmol/L    Chloride 103 98 - 107 mmol/L    CO2 25 22 - 29 mmol/L    Anion Gap 11 7 - 16 mmol/L    Glucose 91 74 - 99 mg/dL    BUN 11 6 - 20 mg/dL    CREATININE 0.7 0.5 - 1.0 mg/dL    GFR Non-African American >60 >=60 mL/min/1.73    GFR African American >60     Calcium 9.3 8.6 - 10.2 mg/dL    Total Protein 7.5 6.4 - 8.3 g/dL    Albumin 4.4 3.5 - 5.2 g/dL    Total Bilirubin 0.2 0.0 - 1.2 mg/dL    Alkaline Phosphatase 66 35 - 104 U/L    ALT 22 0 - 32 U/L    AST 21 0 - 31 U/L       RADIOLOGY:  Interpreted by Radiologist.  CT ABDOMEN PELVIS WO CONTRAST Additional Contrast? None   Final Result   1. No acute abnormality is seen in the abdomen or the pelvis. 2. No urolithiasis or hydronephrosis. RECOMMENDATIONS:   Unavailable             ------------------------- NURSING NOTES AND VITALS REVIEWED ---------------------------   The nursing notes within the ED encounter and vital signs as below have been reviewed. BP (!) 158/96   Pulse 98   Temp 97.8 °F (36.6 °C) (Temporal)   Resp 16   Wt 137 lb (62.1 kg)   LMP 05/11/2013   SpO2 99%   BMI 27.67 kg/m²   Oxygen Saturation Interpretation: Normal      ---------------------------------------------------PHYSICAL EXAM--------------------------------------    Physical Exam  Vitals and nursing note reviewed. Constitutional:       General: She is not in acute distress. Appearance: Normal appearance. She is well-developed. She is not ill-appearing. HENT:      Head: Normocephalic and atraumatic. Mouth/Throat:      Mouth: Mucous membranes are dry. Pharynx: Oropharynx is clear. Neck:      Vascular: No JVD. Trachea: No tracheal deviation. Cardiovascular:      Rate and Rhythm: Normal rate and regular rhythm. Heart sounds: Normal heart sounds. No murmur heard. Pulmonary:      Effort: Pulmonary effort is normal. No respiratory distress. Breath sounds: Normal breath sounds. Abdominal:      General: Bowel sounds are normal. There is no distension. Palpations: Abdomen is soft. Tenderness: There is left CVA tenderness. Comments: LLQ abd TTP. No rebound or guarding. Abd is soft. Musculoskeletal:         General: No tenderness or deformity. Normal range of motion. Cervical back: Normal range of motion and neck supple. Skin:     General: Skin is warm and dry. Capillary Refill: Capillary refill takes less than 2 seconds. Coloration: Skin is not pale. Findings: No erythema. Neurological:      Mental Status: She is alert and oriented to person, place, and time. Mental status is at baseline. Motor: No weakness. Psychiatric:         Mood and Affect: Mood normal.         Behavior: Behavior normal.         Thought Content:  Thought content normal.            ------------------------------ ED COURSE/MEDICAL DECISION MAKING----------------------  Medications   ketorolac (TORADOL) injection 15 mg (15 mg IntraVENous Given 12/21/21 1619)   0.9 % sodium chloride bolus (0 mLs IntraVENous Stopped 12/21/21 1728)         ED COURSE:     CT ABDOMEN PELVIS WO CONTRAST Additional Contrast? None   Final Result   1. No acute abnormality is seen in the abdomen or the pelvis. 2. No urolithiasis or hydronephrosis. RECOMMENDATIONS:   Unavailable             Procedures:  Procedures     Medical Decision Making:   MDM   The patient is a 40-year-old female presenting the ED with left flank pain and concern for UTI and possible kidney stone. She thinks may have passed at this morning. Patient is afebrile and hemodynamically stable. Her labs are unremarkable including no leukocytosis and normal kidney function. UA does show moderate amount of bacteria in the presence of a moderate amount of epithelial cells. She has no other signs of UTI. She has no blood in the urine. CT the abdomen pelvis shows no acute stone or other abnormality at this time. Patient may have passed a stone he may be experiencing renal colic versus musculoskeletal pain. Patient was feeling better after fluids and Toradol. She will be treated with ibuprofen and Flexeril. She is encouraged to have the primary care physician or return with any new or worsening symptoms. Counseling: The emergency provider has spoken with the patient and discussed todays results, in addition to providing specific details for the plan of care and counseling regarding the diagnosis and prognosis. Questions are answered at this time and they are agreeable with the plan.      --------------------------------- IMPRESSION AND DISPOSITION ---------------------------------    IMPRESSION  1. Left flank pain    2. Renal colic        DISPOSITION  Disposition: Discharge to home  Patient condition is good      Electronically signed by Mk Mccann PA-C   DD: 12/21/21  **This report was transcribed using voice recognition software.  Every effort was made to ensure

## 2021-12-24 LAB — URINE CULTURE, ROUTINE: NORMAL

## 2022-02-07 ENCOUNTER — HOSPITAL ENCOUNTER (OUTPATIENT)
Dept: ULTRASOUND IMAGING | Age: 37
Discharge: HOME | End: 2022-02-09
Payer: COMMERCIAL

## 2022-02-07 DIAGNOSIS — E05.90 HYPERTHYROIDISM: ICD-10-CM

## 2022-02-07 PROCEDURE — 76536 US EXAM OF HEAD AND NECK: CPT

## 2022-02-08 ENCOUNTER — OFFICE VISIT (OUTPATIENT)
Dept: ENDOCRINOLOGY | Age: 37
End: 2022-02-08
Payer: COMMERCIAL

## 2022-02-08 VITALS
DIASTOLIC BLOOD PRESSURE: 82 MMHG | OXYGEN SATURATION: 98 % | RESPIRATION RATE: 18 BRPM | WEIGHT: 142 LBS | SYSTOLIC BLOOD PRESSURE: 141 MMHG | HEART RATE: 76 BPM | BODY MASS INDEX: 28.63 KG/M2 | HEIGHT: 59 IN

## 2022-02-08 DIAGNOSIS — E05.90 HYPERTHYROIDISM: ICD-10-CM

## 2022-02-08 DIAGNOSIS — E04.2 MULTINODULAR GOITER: ICD-10-CM

## 2022-02-08 DIAGNOSIS — E05.90 HYPERTHYROIDISM: Primary | ICD-10-CM

## 2022-02-08 DIAGNOSIS — E55.9 VITAMIN D DEFICIENCY: ICD-10-CM

## 2022-02-08 LAB
ANION GAP SERPL CALCULATED.3IONS-SCNC: 13 MMOL/L (ref 7–16)
BUN BLDV-MCNC: 7 MG/DL (ref 6–20)
CALCIUM SERPL-MCNC: 9.3 MG/DL (ref 8.6–10.2)
CHLORIDE BLD-SCNC: 101 MMOL/L (ref 98–107)
CO2: 22 MMOL/L (ref 22–29)
CREAT SERPL-MCNC: 0.7 MG/DL (ref 0.5–1)
GFR AFRICAN AMERICAN: >60
GFR NON-AFRICAN AMERICAN: >60 ML/MIN/1.73
GLUCOSE BLD-MCNC: 97 MG/DL (ref 74–99)
POTASSIUM SERPL-SCNC: 3.9 MMOL/L (ref 3.5–5)
SODIUM BLD-SCNC: 136 MMOL/L (ref 132–146)
T4 FREE: 1.25 NG/DL (ref 0.93–1.7)
TSH SERPL DL<=0.05 MIU/L-ACNC: 0.39 UIU/ML (ref 0.27–4.2)
VITAMIN D 25-HYDROXY: 20 NG/ML (ref 30–100)

## 2022-02-08 PROCEDURE — G8417 CALC BMI ABV UP PARAM F/U: HCPCS | Performed by: INTERNAL MEDICINE

## 2022-02-08 PROCEDURE — G8484 FLU IMMUNIZE NO ADMIN: HCPCS | Performed by: INTERNAL MEDICINE

## 2022-02-08 PROCEDURE — 1036F TOBACCO NON-USER: CPT | Performed by: INTERNAL MEDICINE

## 2022-02-08 PROCEDURE — 99214 OFFICE O/P EST MOD 30 MIN: CPT | Performed by: INTERNAL MEDICINE

## 2022-02-08 PROCEDURE — G8427 DOCREV CUR MEDS BY ELIG CLIN: HCPCS | Performed by: INTERNAL MEDICINE

## 2022-02-08 NOTE — PROGRESS NOTES
[address] [place] [address] Department of Endocrinology Diabetes and Metabolism   [address] [facility] [address]   Phone: [phone]  Fax: [phone]    Date of Service: 2/8/2022  Primary Care Physician: Krystal Veronica MD  Provider: Ricky Rios MD    Reason for the visit:  Hyperthyroidism, MNG     History of Present Illness: The history is provided by the patient. No  was used. Accuracy of the patient data is excellent.     Madison Diaz is a very pleasant 43 y.o. female seen today for evaluation and management of hyperthyroidism     The patient told me that she was diagnosed with hyperthyroidism about 2 years ago but never required treatment   She has thyroid US and NM thyroid Uptake&scan in 2018 , result not available     6/2020 - Thyroid US small 4 mm hypoechoic nodule in Left lobe     Labs 3/2020 - TSH mildly suppressed at 0.33, free T4 normal 1.26  10/2020 - TSH 0.396, FT4 1.25   8/2021 - TSH 0.304, FT4 1.18     Lab Results   Component Value Date/Time    TSI <0.10 06/18/2020 02:32 PM    TPOABS 3.1 06/18/2020 02:32 PM    THGAB <0.9 06/18/2020 02:32 PM     Patient reported feeling tired, Weight gain but denied palpitations, swelling in the area of the thyroid gland, tremor, sweating, heat intolerance, changes in bowel habits, or muscle weakness    PAST MEDICAL HISTORY   Past Medical History:   Diagnosis Date    Abnormal Pap smear     Back pain     Bronchitis     Chronic back pain     Endometriosis     H/O colposcopy with cervical biopsy     Headache     Hypertension     Placenta previa     Current pregnancy    Pregnancy induced hypertension     Thyroid disease     warm nodule & goiter       PAST SURGICAL HISTORY   Past Surgical History:   Procedure Laterality Date    HERNIA REPAIR      HYSTERECTOMY  2013    OVARY REMOVAL      TUBAL LIGATION  2013    WISDOM TOOTH EXTRACTION         SOCIAL HISTORY   Tobacco:   reports that she quit smoking about 6 years ago. Her smoking use included cigarettes. She smoked 0.00 packs per day. She has never used smokeless tobacco.  Alcohol:   reports no history of alcohol use. Drugs:   reports no history of drug use. FAMILY HISTORY   Family History   Problem Relation Age of Onset    High Blood Pressure Mother        ALLERGIES AND DRUG REACTIONS   Allergies   Allergen Reactions    Aspirin      Other reaction(s): Vomiting    Nsaids Nausea And Vomiting       CURRENT MEDICATIONS   Current Outpatient Medications   Medication Sig Dispense Refill    ibuprofen (ADVIL;MOTRIN) 600 MG tablet Take 1 tablet by mouth 3 times daily as needed for Pain 30 tablet 0    loratadine-pseudoephedrine (CLARITIN-D 12HR) 5-120 MG per extended release tablet Take 1 tablet by mouth 2 times daily 60 tablet 0    acetaminophen (TYLENOL) 500 MG tablet Take 2 tablets by mouth every 8 hours as needed for Pain 50 tablet 0    Dextromethorphan-guaiFENesin (MUCINEX DM MAXIMUM STRENGTH)  MG TB12 Take 1 tablet by mouth every 12 hours as needed (cough and congestion) 28 tablet 0    fluticasone (FLONASE) 50 MCG/ACT nasal spray 2 sprays by Each Nostril route daily 16 g 0    estradiol (CLIMARA) 0.1 MG/24HR       hydroCHLOROthiazide (HYDRODIURIL) 25 MG tablet Take 25 mg by mouth daily      metoprolol succinate (TOPROL XL) 25 MG extended release tablet Take 25 mg by mouth daily       No current facility-administered medications for this visit. Review of Systems  Constitutional: No fever, no chills, no diaphoresis, no generalized weakness. HEENT: No blurred vision, No sore throat, no ear pain, no hair loss  Neck: denied any neck swelling, difficulty swallowing,   Cadrdiopulomary: No CP, SOB or palpitation, No orthopnea or PND. No cough or wheezing. GI: No N/V/D, no constipation, No abdominal pain, no melena or hematochezia   : Denied any dysuria, hematuria, flank pain, discharge, or incontinence.    Skin: denied any rash, ulcer, Hirsute, or hyperpigmentation. MSK: denied any joint deformity, joint pain/swelling, muscle pain, or back pain.   Neuro: no numbess, no tingling, no weakness,     OBJECTIVE    BP (!) 141/82   Pulse 76   Resp 18   Ht 4' 11\" (1.499 m)   Wt 142 lb (64.4 kg)   LMP 05/11/2013   SpO2 98%   BMI 28.68 kg/m²   BP Readings from Last 4 Encounters:   02/08/22 (!) 141/82   12/21/21 (!) 158/96   12/19/21 120/86   12/14/21 122/80     Wt Readings from Last 6 Encounters:   02/08/22 142 lb (64.4 kg)   12/21/21 137 lb (62.1 kg)   12/14/21 146 lb (66.2 kg)   10/12/21 144 lb (65.3 kg)   07/13/21 150 lb (68 kg)   07/11/21 150 lb (68 kg)     Physical examination:  General: awake alert, oriented x3  HEENT: normocephalic non traumatic, no exophthalmos   Neck: supple, thyroid tenderness,  Pulm: Clear equal air entry no added sounds  CVS: S1 + S2  Abd: soft lax, no tenderness  Skin: warm, no lesions, no rash   Neuro: CN intact,  muscle power normal  Psych: normal mood, and affect    Review of Laboratory Data:  I have reviewed the following:  Lab Results   Component Value Date/Time    WBC 4.4 (L) 12/21/2021 03:37 PM    RBC 4.60 12/21/2021 03:37 PM    HGB 14.4 12/21/2021 03:37 PM    HCT 41.8 12/21/2021 03:37 PM    MCV 90.9 12/21/2021 03:37 PM    MCH 31.3 12/21/2021 03:37 PM    MCHC 34.4 12/21/2021 03:37 PM    RDW 12.9 12/21/2021 03:37 PM     12/21/2021 03:37 PM    MPV 8.8 12/21/2021 03:37 PM      Lab Results   Component Value Date/Time     12/21/2021 03:37 PM    K 3.7 12/21/2021 03:37 PM    CO2 25 12/21/2021 03:37 PM    BUN 11 12/21/2021 03:37 PM    CREATININE 0.7 12/21/2021 03:37 PM    CALCIUM 9.3 12/21/2021 03:37 PM    LABGLOM >60 12/21/2021 03:37 PM    GFRAA >60 12/21/2021 03:37 PM      Lab Results   Component Value Date/Time    TSH 0.304 08/03/2021 12:40 PM    T4FREE 1.18 08/03/2021 12:40 PM    F5VXHAO 8.1 10/08/2020 03:34 PM    Q3CXBIE 111.90 06/18/2020 02:32 PM    TSI <0.10 06/18/2020 02:32 PM    TPOABS 3.1 06/18/2020 02:32 PM THGAB <0.9 06/18/2020 02:32 PM     Lab Results   Component Value Date    GLUCOSE 91 12/21/2021    GLUCOSE 73 04/27/2012    LABCREA 10 09/21/2018     No results found for: TRIG, HDL, LDLCALC, CHOL  Lab Results   Component Value Date    VITD25 21 08/03/2021    VITD25 26 06/18/2020     ASSESSMENT & RECOMMENDATIONS   Fernando Thorpe, a 43 y.o.-old female seen in for management of following issues      Previous Hyperthyroidism  · Resolved, Likely due to Thyroiditis   · Previous work up was negative for toxic nodule(s) and graves disease   · Check TFT today     H/o MNG   · Thyroid US 6/2020 --> small 4 mm nodule   · Will continue monitoring with intermittent US   · US 2/7/2022 --> small thyroid nodules slightly increased in size, Rt side nodule now measuring 4 mm and Lt side nodule now measuring 5 mm   · US in few years     vitD deficiency    · Continue vitD supplement   · Check level today     I personally reviewed external notes from PCP and other patient's care team providers, and personally interpreted labs associated with the above diagnosis. I also ordered labs to further assess and manage the above addressed medical conditions    Return in about 1 year (around 2/8/2023) for Multinodular goiter, Hyperthyroidism. The above issues were reviewed with the patient who understood and agreed with the plan. Thank you for allowing us to participate in the care of this patient. Please do not hesitate to contact us with any additional questions. Diagnosis Orders   1. Hyperthyroidism  TSH without Reflex    T4, Free   2. Vitamin D deficiency  Vitamin D 25 Hydroxy    Basic Metabolic Panel   3. Multinodular goiter         Merle Jensen MD  Endocrinologist, [facility] Diabetes Care and Endocrinology   [address] [place] [id number]   Phone: [phone]  Fax: [phone]  ---------------------------------  An electronic signature was used to authenticate this note.  Ryan Moore MD on 2/8/2022 at 9:09 AM

## 2022-02-10 ENCOUNTER — TELEPHONE (OUTPATIENT)
Dept: ENDOCRINOLOGY | Age: 37
End: 2022-02-10

## 2022-02-10 DIAGNOSIS — E05.90 HYPERTHYROIDISM: Primary | ICD-10-CM

## 2022-02-10 DIAGNOSIS — E55.9 VITAMIN D DEFICIENCY: ICD-10-CM

## 2022-03-02 ENCOUNTER — HOSPITAL ENCOUNTER (EMERGENCY)
Age: 37
Discharge: HOME | End: 2022-03-02
Attending: STUDENT IN AN ORGANIZED HEALTH CARE EDUCATION/TRAINING PROGRAM
Payer: COMMERCIAL

## 2022-03-02 ENCOUNTER — APPOINTMENT (OUTPATIENT)
Dept: GENERAL RADIOLOGY | Age: 37
End: 2022-03-02
Payer: COMMERCIAL

## 2022-03-02 VITALS
TEMPERATURE: 98.6 F | HEIGHT: 59 IN | HEART RATE: 70 BPM | BODY MASS INDEX: 28.83 KG/M2 | OXYGEN SATURATION: 99 % | DIASTOLIC BLOOD PRESSURE: 78 MMHG | SYSTOLIC BLOOD PRESSURE: 138 MMHG | WEIGHT: 143 LBS | RESPIRATION RATE: 15 BRPM

## 2022-03-02 DIAGNOSIS — B34.9 VIRAL SYNDROME: Primary | ICD-10-CM

## 2022-03-02 LAB
EKG ATRIAL RATE: 63 BPM
EKG P AXIS: 65 DEGREES
EKG P-R INTERVAL: 146 MS
EKG Q-T INTERVAL: 388 MS
EKG QRS DURATION: 80 MS
EKG QTC CALCULATION (BAZETT): 397 MS
EKG R AXIS: 32 DEGREES
EKG T AXIS: 33 DEGREES
EKG VENTRICULAR RATE: 63 BPM
INFLUENZA A BY PCR: NOT DETECTED
INFLUENZA B BY PCR: NOT DETECTED
SARS-COV-2, NAAT: NOT DETECTED

## 2022-03-02 PROCEDURE — 93005 ELECTROCARDIOGRAM TRACING: CPT | Performed by: STUDENT IN AN ORGANIZED HEALTH CARE EDUCATION/TRAINING PROGRAM

## 2022-03-02 PROCEDURE — 71046 X-RAY EXAM CHEST 2 VIEWS: CPT

## 2022-03-02 PROCEDURE — 87502 INFLUENZA DNA AMP PROBE: CPT

## 2022-03-02 PROCEDURE — 6370000000 HC RX 637 (ALT 250 FOR IP): Performed by: STUDENT IN AN ORGANIZED HEALTH CARE EDUCATION/TRAINING PROGRAM

## 2022-03-02 PROCEDURE — 87635 SARS-COV-2 COVID-19 AMP PRB: CPT

## 2022-03-02 PROCEDURE — 99284 EMERGENCY DEPT VISIT MOD MDM: CPT

## 2022-03-02 RX ORDER — IPRATROPIUM BROMIDE AND ALBUTEROL SULFATE 2.5; .5 MG/3ML; MG/3ML
1 SOLUTION RESPIRATORY (INHALATION) ONCE
Status: COMPLETED | OUTPATIENT
Start: 2022-03-02 | End: 2022-03-02

## 2022-03-02 RX ADMIN — IPRATROPIUM BROMIDE AND ALBUTEROL SULFATE 1 AMPULE: 2.5; .5 SOLUTION RESPIRATORY (INHALATION) at 10:22

## 2022-03-02 NOTE — ED PROVIDER NOTES
Department of Emergency Medicine   ED  Provider Note  Admit Date/RoomTime: 3/2/2022  9:31 AM  ED Room: 07/07          History of Present Illness:  3/2/22, Time: 10:20 AM EST  Chief Complaint   Patient presents with    Chest Congestion     recent symptoms of chest congestion, painful cough, runny nose, headaches       Rhiannon Khan is a 43 y.o. female presenting to the ED for cough and rhinorrhea. Patient has been having symptoms for 2 to 3 days. Her cough is productive of yellow sputum. She has been noting a lot of nasal drainage. She does have history of bronchitis and is unsure if this is causing her symptoms. She has used her albuterol twice today with some relief. She also takes Symbicort and has not missed this. She feels somewhat short of breath when she coughs. She denies any associated chest pain. No nausea, vomiting or abdominal pain. No fevers, dysuria hematuria. The patient does have a sick contact as her fiancé has similar upper respiratory symptoms. She is not vaccinated for COVID. She denies headache to myself. She does state that she has pressure in her face but feels this is likely her sinuses. No recent trauma, numbness, tingling or weakness. No visual change. Of note, the patient does have significant allergies. She states she gets her allergy shots weekly. Review of Systems:   Constitutional symptoms: Denies fever  Eyes: Denies eye pain  Ears, Nose, Mouth, Throat: Denies ear pain. Positive for nasal congestion  Cardiovascular: Denies chest pain  Respiratory: Positive for shortness of breath and cough  Gastrointestinal: Denies blood per rectum  Genitourinary: Denies hematuria  Skin: Denies rashes  Neurological: Denies headache.   Positive for facial pressure  Musculoskeletal: Denies extremity pain    --------------------------------------------- PAST HISTORY ---------------------------------------------  Past Medical History:  has a past medical history of Abnormal Pap smear, Back pain, Bronchitis, Chronic back pain, Endometriosis, H/O colposcopy with cervical biopsy, Headache, Hypertension, Placenta previa, Pregnancy induced hypertension, and Thyroid disease. Past Surgical History:  has a past surgical history that includes Ovary removal; Millen tooth extraction; Tubal ligation (2013); Hysterectomy (2013); and hernia repair. Social History:  reports that she quit smoking about 6 years ago. Her smoking use included cigarettes. She smoked 0.00 packs per day. She has never used smokeless tobacco. She reports that she does not drink alcohol and does not use drugs. Family History: family history includes High Blood Pressure in her mother. . Unless otherwise noted, family history is non contributory    The patients home medications have been reviewed. Allergies: Aspirin and Nsaids    I have reviewed the past medical history, past surgical history, social history, and family history    ---------------------------------------------------PHYSICAL EXAM--------------------------------------    General: The patient is conversational and lying comfortably in bed. Head: Normocephalic and atraumatic. Eyes: Sclera non-icteric and no conjunctival injection  ENT: Nasal and oral membranes moist.  Bilateral tympanic membranes visualized without erythema or bulging. No tenderness of the auricle or tragus. Uvula midline. No peritonsillar exudate or swelling. Neck: Trachea midline. No JVD  Respiratory: Lungs clear to auscultation bilaterally. Patient speaking in full sentences. Cardiovascular: Regular rate. No pedal edema. Gastrointestinal:  Abdomen is soft and nondistended. Bowel sounds present. There is no tenderness. There is no guarding or rebound. Musculoskeletal: No deformity  Skin: Skin warm and dry. No rashes. Neurologic: No gross motor deficits. No aphasia. Pupils are equal and reactive to light. Extraocular eye movements intact. Sensation intact bilaterally. Symmetric facies. Tongue protrudes midline. 5 out of 5 symmetric strength of the upper and lower extremities. Negative finger-to-nose testing. Alert and oriented. Psychiatric: Normal affect.    -------------------------------------------------- RESULTS -------------------------------------------------  I have personally reviewed all laboratory and imaging results for this patient. Results are listed below. LABS: (Lab results interpreted by me)  Results for orders placed or performed during the hospital encounter of 03/02/22   COVID-19, Rapid    Specimen: Nasopharyngeal Swab   Result Value Ref Range    SARS-CoV-2, NAAT Not Detected Not Detected   RAPID INFLUENZA A/B ANTIGENS    Specimen: Nasopharyngeal   Result Value Ref Range    Influenza A by PCR Not Detected Not Detected    Influenza B by PCR Not Detected Not Detected   EKG 12 Lead   Result Value Ref Range    Ventricular Rate 63 BPM    Atrial Rate 63 BPM    P-R Interval 146 ms    QRS Duration 80 ms    Q-T Interval 388 ms    QTc Calculation (Bazett) 397 ms    P Axis 65 degrees    R Axis 32 degrees    T Axis 33 degrees   ,     RADIOLOGY:  Interpreted by Radiologist unless otherwise specified  XR CHEST (2 VW)   Final Result   No acute process. ------------------------- NURSING NOTES AND VITALS REVIEWED ---------------------------   The nursing notes within the ED encounter and vital signs as below have been reviewed by myself  /78   Pulse 70   Temp 98.6 °F (37 °C) (Oral)   Resp 15   Ht 4' 11\" (1.499 m)   Wt 143 lb (64.9 kg)   LMP 05/11/2013   SpO2 99%   BMI 28.88 kg/m²     Oxygen Saturation Interpretation: Normal    The patients available past medical records and past encounters were reviewed.       ------------------------------ ED COURSE/MEDICAL DECISION MAKING----------------------  Medications   ipratropium-albuterol (DUONEB) nebulizer solution 1 ampule (1 ampule Inhalation Given 3/2/22 1022)       Medical Decision Making: This is a 43 y.o. female presenting to the ED for nasal congestion and cough. On initial presentation, the patient's vital signs are interpreted as hypertensive with known history of hypertension, afebrile and saturating well on room air. Based on history and physical exam, we have a broad differential.  The patient symptoms may be secondary to viral syndrome. She has a sick contact. Will evaluate for Covid and influenza. With her cough we also considered pneumonia. Patient denies any chest pain but is feeling somewhat short of breath. She has known history of bronchitis. She is not acutely in exacerbation however will be symptomatically treated with a breathing treatment. We will evaluate for arrhythmia with an EKG. The patient is low risk Wells and can be PERCed out. Therefore will not further evaluate for PE. We will perform ambulatory pulse ox. Neurological exam is nonfocal and no history of trauma. A 12-lead EKG was performed and interpreted by myself. The rate is 63 with normal sinus rhythm and normal axis. Patient does have sinus arrhythmia noted. The LA interval is 146, QRS interval is 80, and QTC interval is 397. T wave inversion in V1. No acute ST elevation or depression. Poor R wave progression. This is sinus rhythm with sinus arrhythmia. Laboratory studies show influenza and Covid negative. Chest x-ray shows no acute process. This is interpreted by radiology. Ambulatory pulse ox performed and the patient is saturating %. On reevaluation, patient endorses resolution of her shortness of breath. She is resting comfortably. She has clear lung examination. Her significant other did test positive for influenza. We discussed that her test may be a false negative or it may be early in the disease process. She likely has a viral syndrome that is worsening her bronchitis. I did offer her refills of her inhalers but she has these at home.   I also discussed there could be a component of her allergies contributing to this. She does not require refills of her Flonase or Claritin. We have stressed the importance of appropriate hydration. Patient is given strict return precautions. She is to follow with her primary care physician. Verbalized understanding and agreeable to the plan. This patient's ED course included: a personal history and physicial examination and re-evaluation prior to disposition    This patient has improved during their ED course. Consultations:  None    The cardiac monitor revealed sinus rhythm with a heart rate in the 70s as interpreted by me. The cardiac monitor was ordered secondary to the patient's shortness of breath and to monitor the patient for dysrhythmia. CPT [id number]    Oxygen Saturation Interpretation: 99 % on room air. Normal    Counseling:   I have spoken with the patient and discussed todays results, in addition to providing specific details for the plan of care and counseling regarding the diagnosis and prognosis. Questions are answered at this time and they are agreeable with the plan.     --------------------------------- IMPRESSION AND DISPOSITION ---------------------------------    IMPRESSION  1. Viral syndrome        DISPOSITION  Disposition: Discharge to home  Patient condition is fair    IDr. Beata, am the primary provider of record    NOTE: This report was transcribed using voice recognition software.  Every effort was made to ensure accuracy; however, inadvertent computerized transcription errors may be present        Beata Sosa MD  03/02/22 0859

## 2022-03-02 NOTE — ED NOTES
Pt sitting sat level=100% and pulse=73, pt walking sat level=98% and pulse=91     Higinio Vizcarra RN  03/02/22 [id number]

## 2022-04-07 DIAGNOSIS — E55.9 VITAMIN D DEFICIENCY: ICD-10-CM

## 2022-04-07 RX ORDER — METHOCARBAMOL 750 MG/1
TABLET ORAL
Qty: 4 CAPSULE | Refills: 1 | OUTPATIENT
Start: 2022-04-07

## 2022-08-22 ENCOUNTER — HOSPITAL ENCOUNTER (OUTPATIENT)
Age: 37
Discharge: HOME | End: 2022-08-22
Payer: COMMERCIAL

## 2022-08-22 PROCEDURE — 86003 ALLG SPEC IGE CRUDE XTRC EA: CPT

## 2022-08-26 LAB
Lab: NORMAL
REPORT: NORMAL
THIS TEST SENT TO: NORMAL

## 2022-10-12 ENCOUNTER — HOSPITAL ENCOUNTER (OUTPATIENT)
Dept: ULTRASOUND IMAGING | Age: 37
Discharge: HOME | End: 2022-10-14
Payer: COMMERCIAL

## 2022-10-12 DIAGNOSIS — E04.2 MULTINODULAR GOITER: ICD-10-CM

## 2022-10-12 DIAGNOSIS — E05.90 HYPERTHYROIDISM: ICD-10-CM

## 2022-10-12 PROCEDURE — 76536 US EXAM OF HEAD AND NECK: CPT

## 2022-10-16 ENCOUNTER — TELEPHONE (OUTPATIENT)
Dept: ENDOCRINOLOGY | Age: 37
End: 2022-10-16

## 2022-10-16 NOTE — TELEPHONE ENCOUNTER
Endocrine staff,   Please notify pt that I have reviewed your recent results. Excellent!  US was negative for any concerning nodules

## 2023-02-08 ENCOUNTER — OFFICE VISIT (OUTPATIENT)
Dept: ENDOCRINOLOGY | Age: 38
End: 2023-02-08
Payer: COMMERCIAL

## 2023-02-08 VITALS
WEIGHT: 146 LBS | DIASTOLIC BLOOD PRESSURE: 90 MMHG | HEART RATE: 73 BPM | OXYGEN SATURATION: 99 % | HEIGHT: 59 IN | BODY MASS INDEX: 29.43 KG/M2 | SYSTOLIC BLOOD PRESSURE: 142 MMHG | RESPIRATION RATE: 18 BRPM

## 2023-02-08 DIAGNOSIS — E06.9 THYROIDITIS: ICD-10-CM

## 2023-02-08 DIAGNOSIS — E04.2 MULTINODULAR GOITER: Primary | ICD-10-CM

## 2023-02-08 PROCEDURE — G8427 DOCREV CUR MEDS BY ELIG CLIN: HCPCS | Performed by: INTERNAL MEDICINE

## 2023-02-08 PROCEDURE — 1036F TOBACCO NON-USER: CPT | Performed by: INTERNAL MEDICINE

## 2023-02-08 PROCEDURE — G8417 CALC BMI ABV UP PARAM F/U: HCPCS | Performed by: INTERNAL MEDICINE

## 2023-02-08 PROCEDURE — 99214 OFFICE O/P EST MOD 30 MIN: CPT | Performed by: INTERNAL MEDICINE

## 2023-02-08 PROCEDURE — G8484 FLU IMMUNIZE NO ADMIN: HCPCS | Performed by: INTERNAL MEDICINE

## 2023-02-08 NOTE — PROGRESS NOTES
[facility] Department of Endocrinology Diabetes and Metabolism   [address] [place] [address]   Phone: [phone]  Fax: [phone]    Date of Service: 2/8/2023  Primary Care Physician: Alexis Bazzi MD  Provider: Rajesh Fonseca MD    Reason for the visit:  Hyperthyroidism, MNG     History of Present Illness:  The history is provided by the patient. No  was used. Accuracy of the patient data is excellent.    Flori Dyer is a very pleasant 37 y.o. female seen today for evaluation and management of hyperthyroidism     The patient told me that she was diagnosed with hyperthyroidism about 2 years ago but never required treatment   She has thyroid US and NM thyroid Uptake&scan in 2018 , result not available     6/2020 - Thyroid US small 4 mm hypoechoic nodule in Left lobe     Labs 3/2020 - TSH mildly suppressed at 0.33, free T4 normal 1.26  10/2020 - TSH 0.396, FT4 1.25   8/2021 - TSH 0.304, FT4 1.18   Lab Results   Component Value Date/Time    TSH 0.393 02/08/2022 09:20 AM    T4FREE 1.25 02/08/2022 09:20 AM       Lab Results   Component Value Date/Time    TSI <0.10 06/18/2020 02:32 PM    TPOABS 3.1 06/18/2020 02:32 PM    THGAB <0.9 06/18/2020 02:32 PM       Due for labs     Patient reported feeling tired, Weight gain but denied palpitations, swelling in the area of the thyroid gland, tremor, sweating, heat intolerance, changes in bowel habits, or muscle weakness    PAST MEDICAL HISTORY   Past Medical History:   Diagnosis Date    Abnormal Pap smear     Back pain     Bronchitis     Chronic back pain     Endometriosis     H/O colposcopy with cervical biopsy     Headache     Hypertension     Placenta previa     Current pregnancy    Pregnancy induced hypertension     Thyroid disease     warm nodule & goiter       PAST SURGICAL HISTORY   Past Surgical History:   Procedure Laterality Date    HERNIA REPAIR      HYSTERECTOMY (CERVIX STATUS UNKNOWN)  2013    OVARY  REMOVAL      TUBAL LIGATION  2013    WISDOM TOOTH EXTRACTION         SOCIAL HISTORY   Tobacco:   reports that she quit smoking about 7 years ago. Her smoking use included cigarettes. She has never used smokeless tobacco.  Alcohol:   reports no history of alcohol use. Drugs:   reports no history of drug use. FAMILY HISTORY   Family History   Problem Relation Age of Onset    High Blood Pressure Mother        ALLERGIES AND DRUG REACTIONS   Allergies   Allergen Reactions    Aspirin      Other reaction(s): Vomiting    Nsaids Nausea And Vomiting       CURRENT MEDICATIONS   Current Outpatient Medications   Medication Sig Dispense Refill    vitamin D (CHOLECALCIFEROL) 76958 UNIT CAPS Take 1 capsule weekly, for 8 weeks only. No refills 8 capsule 0    loratadine-pseudoephedrine (CLARITIN-D 12HR) 5-120 MG per extended release tablet Take 1 tablet by mouth 2 times daily 60 tablet 0    acetaminophen (TYLENOL) 500 MG tablet Take 2 tablets by mouth every 8 hours as needed for Pain 50 tablet 0    fluticasone (FLONASE) 50 MCG/ACT nasal spray 2 sprays by Each Nostril route daily 16 g 0    estradiol (CLIMARA) 0.1 MG/24HR       hydroCHLOROthiazide (HYDRODIURIL) 25 MG tablet Take 25 mg by mouth daily      metoprolol succinate (TOPROL XL) 25 MG extended release tablet Take 25 mg by mouth daily       No current facility-administered medications for this visit. Review of Systems  Constitutional: No fever, no chills, no diaphoresis, no generalized weakness. HEENT: No blurred vision, No sore throat, no ear pain, no hair loss  Neck: denied any neck swelling, difficulty swallowing,   Cadrdiopulomary: No CP, SOB or palpitation, No orthopnea or PND. No cough or wheezing. GI: No N/V/D, no constipation, No abdominal pain, no melena or hematochezia   : Denied any dysuria, hematuria, flank pain, discharge, or incontinence. Skin: denied any rash, ulcer, Hirsute, or hyperpigmentation.    MSK: denied any joint deformity, joint pain/swelling, muscle pain, or back pain.   Neuro: no numbess, no tingling, no weakness,     OBJECTIVE    BP (!) 142/90   Pulse 73   Resp 18   Ht 4' 11\" (1.499 m)   Wt 146 lb (66.2 kg)   LMP 05/11/2013   SpO2 99%   BMI 29.49 kg/m²   BP Readings from Last 4 Encounters:   02/08/23 (!) 142/90   03/02/22 138/78   02/08/22 (!) 141/82   12/21/21 (!) 158/96     Wt Readings from Last 6 Encounters:   02/08/23 146 lb (66.2 kg)   03/02/22 143 lb (64.9 kg)   02/08/22 142 lb (64.4 kg)   12/21/21 137 lb (62.1 kg)   12/14/21 146 lb (66.2 kg)   10/12/21 144 lb (65.3 kg)     Physical examination:  General: awake alert, oriented x3  HEENT: normocephalic non traumatic, no exophthalmos   Neck: supple, thyroid tenderness,  Pulm: Clear equal air entry no added sounds  CVS: S1 + S2  Abd: soft lax, no tenderness  Skin: warm, no lesions, no rash   Neuro: CN intact,  muscle power normal  Psych: normal mood, and affect    Review of Laboratory Data:  I have reviewed the following:  Lab Results   Component Value Date/Time    WBC 4.4 (L) 12/21/2021 03:37 PM    RBC 4.60 12/21/2021 03:37 PM    HGB 14.4 12/21/2021 03:37 PM    HCT 41.8 12/21/2021 03:37 PM    MCV 90.9 12/21/2021 03:37 PM    MCH 31.3 12/21/2021 03:37 PM    MCHC 34.4 12/21/2021 03:37 PM    RDW 12.9 12/21/2021 03:37 PM     12/21/2021 03:37 PM    MPV 8.8 12/21/2021 03:37 PM      Lab Results   Component Value Date/Time     02/08/2022 09:20 AM    K 3.9 02/08/2022 09:20 AM    K 3.7 12/21/2021 03:37 PM    CO2 22 02/08/2022 09:20 AM    BUN 7 02/08/2022 09:20 AM    CREATININE 0.7 02/08/2022 09:20 AM    CALCIUM 9.3 02/08/2022 09:20 AM    LABGLOM >60 02/08/2022 09:20 AM    GFRAA >60 02/08/2022 09:20 AM      Lab Results   Component Value Date/Time    TSH 0.393 02/08/2022 09:20 AM    T4FREE 1.25 02/08/2022 09:20 AM    Y5OGIMX 8.1 10/08/2020 03:34 PM    J1ULKLZ 111.90 06/18/2020 02:32 PM    TSI <0.10 06/18/2020 02:32 PM    TPOABS 3.1 06/18/2020 02:32 PM    THGAB <0.9 06/18/2020 02:32 PM     Lab Results   Component Value Date/Time    GLUCOSE 97 02/08/2022 09:20 AM    GLUCOSE 73 04/27/2012 03:15 PM    LABCREA 10 09/21/2018 02:53 PM     No results found for: TRIG, HDL, LDLCALC, CHOL  Lab Results   Component Value Date/Time    VITD25 20 02/08/2022 09:20 AM    VITD25 21 08/03/2021 12:40 PM     ASSESSMENT & RECOMMENDATIONS   Roya Peraza, a 42 y.o.-old female seen in for management of following issues      Previous Hyperthyroidism  Resolved, Likely due to Thyroiditis   Previous work up was negative for toxic nodule(s) and graves disease   Due for lab today     H/o MNG   Thyroid US 6/2020 --> small 4 mm nodule   Will continue monitoring with intermittent US   US 2/7/2022 --> small thyroid nodules slightly increased in size, Rt side nodule now measuring 4 mm and Lt side nodule now measuring 5 mm   US in a year      vitD deficiency    Continue vitD supplement     I personally reviewed external notes from PCP and other patient's care team providers, and personally interpreted labs associated with the above diagnosis. I also ordered labs to further assess and manage the above addressed medical conditions    Return in about 1 year (around 2/8/2024) for Thyroiditis . The above issues were reviewed with the patient who understood and agreed with the plan. Thank you for allowing us to participate in the care of this patient. Please do not hesitate to contact us with any additional questions. Diagnosis Orders   1. Multinodular goiter  TSH    T4, Free    T4    US THYROID      2. Thyroiditis  TSH    T4, Free    T4            Chris Barcenas MD  Endocrinologist, [facility] Diabetes Care and Endocrinology   [address] [facility], [address] [place] [address],[facility] [id number]   Phone: [phone]  Fax: [phone]  ---------------------------------  An electronic signature was used to authenticate this note.  Neema Jansen MD on 2/8/2023 at 10:01 AM

## 2023-02-12 ENCOUNTER — TELEPHONE (OUTPATIENT)
Dept: ENDOCRINOLOGY | Age: 38
End: 2023-02-12

## 2023-03-14 ENCOUNTER — HOSPITAL ENCOUNTER (EMERGENCY)
Age: 38
Discharge: HOME | End: 2023-03-14
Attending: EMERGENCY MEDICINE
Payer: COMMERCIAL

## 2023-03-14 VITALS
WEIGHT: 143 LBS | SYSTOLIC BLOOD PRESSURE: 142 MMHG | RESPIRATION RATE: 16 BRPM | DIASTOLIC BLOOD PRESSURE: 90 MMHG | TEMPERATURE: 98.2 F | HEART RATE: 92 BPM | OXYGEN SATURATION: 96 % | BODY MASS INDEX: 28.88 KG/M2

## 2023-03-14 DIAGNOSIS — S61.211A LACERATION OF LEFT INDEX FINGER WITHOUT FOREIGN BODY WITHOUT DAMAGE TO NAIL, INITIAL ENCOUNTER: Primary | ICD-10-CM

## 2023-03-14 PROCEDURE — 90714 TD VACC NO PRESV 7 YRS+ IM: CPT

## 2023-03-14 PROCEDURE — 2500000003 HC RX 250 WO HCPCS: Performed by: NURSE PRACTITIONER

## 2023-03-14 PROCEDURE — 90471 IMMUNIZATION ADMIN: CPT

## 2023-03-14 PROCEDURE — 99284 EMERGENCY DEPT VISIT MOD MDM: CPT

## 2023-03-14 PROCEDURE — 6370000000 HC RX 637 (ALT 250 FOR IP): Performed by: NURSE PRACTITIONER

## 2023-03-14 PROCEDURE — 12001 RPR S/N/AX/GEN/TRNK 2.5CM/<: CPT

## 2023-03-14 PROCEDURE — 6360000002 HC RX W HCPCS

## 2023-03-14 RX ORDER — LIDOCAINE HYDROCHLORIDE 10 MG/ML
5 INJECTION, SOLUTION INFILTRATION; PERINEURAL ONCE
Status: COMPLETED | OUTPATIENT
Start: 2023-03-14 | End: 2023-03-14

## 2023-03-14 RX ORDER — BACITRACIN ZINC 500 [USP'U]/G
OINTMENT TOPICAL ONCE
Status: COMPLETED | OUTPATIENT
Start: 2023-03-14 | End: 2023-03-14

## 2023-03-14 RX ORDER — BACITRACIN ZINC AND POLYMYXIN B SULFATE 500; 1000 [USP'U]/G; [USP'U]/G
OINTMENT TOPICAL
Qty: 30 G | Refills: 0 | Status: SHIPPED | OUTPATIENT
Start: 2023-03-14 | End: 2023-03-21

## 2023-03-14 RX ORDER — CEPHALEXIN 500 MG/1
500 CAPSULE ORAL 3 TIMES DAILY
Qty: 15 CAPSULE | Refills: 0 | Status: SHIPPED | OUTPATIENT
Start: 2023-03-14 | End: 2023-03-19

## 2023-03-14 RX ADMIN — BACITRACIN ZINC: 500 OINTMENT TOPICAL at 15:51

## 2023-03-14 RX ADMIN — CLOSTRIDIUM TETANI TOXOID ANTIGEN (FORMALDEHYDE INACTIVATED) AND CORYNEBACTERIUM DIPHTHERIAE TOXOID ANTIGEN (FORMALDEHYDE INACTIVATED) 0.5 ML: 5; 2 INJECTION, SUSPENSION INTRAMUSCULAR at 14:35

## 2023-03-14 RX ADMIN — LIDOCAINE HYDROCHLORIDE 5 ML: 10 INJECTION, SOLUTION INFILTRATION; PERINEURAL at 14:40

## 2023-03-14 ASSESSMENT — PAIN DESCRIPTION - ORIENTATION: ORIENTATION: LEFT

## 2023-03-14 ASSESSMENT — PAIN - FUNCTIONAL ASSESSMENT: PAIN_FUNCTIONAL_ASSESSMENT: 0-10

## 2023-03-14 ASSESSMENT — PAIN DESCRIPTION - LOCATION: LOCATION: HAND

## 2023-03-14 ASSESSMENT — PAIN SCALES - GENERAL: PAINLEVEL_OUTOF10: 10

## 2023-03-14 ASSESSMENT — PAIN DESCRIPTION - DESCRIPTORS: DESCRIPTORS: ACHING;THROBBING;SHARP

## 2023-03-14 NOTE — Clinical Note
Svitlana Gaffney was seen and treated in our emergency department on 3/14/2023. She may return to work on 03/17/2023. If you have any questions or concerns, please don't hesitate to call.       Kimmy Yoder, DIONNE - CNP

## 2023-03-14 NOTE — ED PROVIDER NOTES
Shared [facility]-ED Attending Visit. CC: No      Department of Emergency Medicine  ED Provider Note  Admit Date: 3/14/2023  Room: 01/01            HPI:  3/14/23, Time: 2:08 PM EDT  . Alexandria Lesches is a 42 y.o. old female presenting to the emergency department for a laceration to the left index finger, caused by knife, which occurred approximately 1 hour(s) prior to arrival.  She states she was cutting open a bag of #puppies and the knife slipped cutting from distal to proximal and of the finger pad on the left index finger. There is not a possibility of retained foreign body in the affected area. The patients tetanus status is more than 10 years ago. There is pain at injury site. The injury was not work related. Review of Systems:   Pertinent positives and negatives are stated within HPI, all other systems reviewed and are negative.          --------------------------------------------- PAST HISTORY ---------------------------------------------  Past Medical History:  has a past medical history of Abnormal Pap smear, Back pain, Bronchitis, Chronic back pain, Endometriosis, H/O colposcopy with cervical biopsy, Headache, Hypertension, Placenta previa, Pregnancy induced hypertension, and Thyroid disease. Past Surgical History:  has a past surgical history that includes Ovary removal; [place] tooth extraction; Tubal ligation (2013); Hysterectomy (2013); and hernia repair. Social History:  reports that she quit smoking about 7 years ago. Her smoking use included cigarettes. She has never used smokeless tobacco. She reports that she does not drink alcohol and does not use drugs. Family History: family history includes High Blood Pressure in her mother. The patients home medications have been reviewed.     Allergies: Aspirin and Nsaids    -------------------------------------------------- RESULTS -------------------------------------------------  All laboratory and radiology results have been personally reviewed by myself   LABS:  No results found for this visit on 03/14/23. RADIOLOGY:  Interpreted by Radiologist.  No orders to display       ------------------------- NURSING NOTES AND VITALS REVIEWED ---------------------------   The nursing notes within the ED encounter and vital signs as below have been reviewed. BP (!) 142/90   Pulse 92   Temp 98.2 °F (36.8 °C) (Oral)   Resp 16   Wt 143 lb (64.9 kg)   LMP 05/11/2013   SpO2 96%   BMI 28.88 kg/m²   Oxygen Saturation Interpretation: Normal      ---------------------------------------------------PHYSICAL EXAM--------------------------------------    Constitutional/General: Alert and oriented x3, well appearing, non toxic in NAD  Head: Normocephalic and atraumatic  Eyes: PERRL, EOMI  Mouth: Oropharynx clear, handling secretions, no trismus  Neck: Supple, full ROM,   Pulmonary: Lungs clear to auscultation bilaterally, no wheezes, rales, or rhonchi. Not in respiratory distress  Cardiovascular:  Regular rate and rhythm, no murmurs, gallops, or rubs. 2+ distal pulses  Abdomen: Soft, non tender, non distended,   Extremities:  Warm and well perfused  Skin: warm and dry without rash. There is a laceration of the index finger, which measures 2cm. There is no evidence of foreign body or gross contamination. Neurologic: Speech clear and appropriate, answers questions appropriately. Psych: Normal Affect      ------------------------------ ED COURSE/MEDICAL DECISION MAKING----------------------  Medications   tetanus & diphtheria toxoids (adult) 5-2 LFU injection 0.5 mL (has no administration in time range)             LACERATION REPAIR  PROCEDURE NOTE:  Unless otherwise indicated, this procedure was performed by myself. Laceration #: 1. Location: left index finger. Length: 2cm. The wound area was irrigated with sterile water, cleansed with povidone iodine, cleansend with shur-clens and draped in a sterile fashion.   The wound area was anesthetized with Lidocaine 1% without epinephrine. WOUND COMPLEXITY:    Debridement: subcutaneous. Undermining: None. Wound Margins Revised: yes  Flaps Aligned: yes. The wound was explored with the following results No foreign bodies found, no foreign body or tendon injury seen. The wound was closed with 5-0 Prolene using interrupted sutures. Dressing:  bacitracin and gauze was placed. Total number suture: 7      Medical Decision Making:    Pete Harrison is a 42 y.o. old female presenting to the emergency department for a laceration to the left index finger, caused by knife, which occurred approximately 1 hour(s) prior to arrival.  She states she was cutting open a bag of hushpuppies and the knife slipped cutting from distal to proximal and of the finger pad on the left index finger. There is not a possibility of retained foreign body in the affected area. there is no involvement of tendon or muscle as the patient has full range of motion to the finger. The patients tetanus status is more than 10 years ago. There is pain at injury site. The injury was not work related. See above for suture information. Wound was dressed with bacitracin and gauze. Patient was given Keflex 500mg x 5 days for infection prevention. Patient tolerated procedure well. The patient was seen and evaluated by dr. Tino Eagle ed attending. Counseling: The emergency provider has spoken with the patient and discussed todays results, in addition to providing specific details for the plan of care and counseling regarding the diagnosis and prognosis. Questions are answered at this time and they are agreeable with the plan.      --------------------------------- IMPRESSION AND DISPOSITION ---------------------------------    IMPRESSION  1.  Laceration of left index finger without foreign body without damage to nail, initial encounter        DISPOSITION  Disposition: Discharge to home  Patient condition is stable Rayray Barragan, APRN - CNP  03/14/23 [id number]

## 2023-03-22 ENCOUNTER — APPOINTMENT (OUTPATIENT)
Dept: GENERAL RADIOLOGY | Age: 38
End: 2023-03-22
Payer: COMMERCIAL

## 2023-03-22 ENCOUNTER — HOSPITAL ENCOUNTER (EMERGENCY)
Age: 38
Discharge: HOME | End: 2023-03-22
Attending: STUDENT IN AN ORGANIZED HEALTH CARE EDUCATION/TRAINING PROGRAM
Payer: COMMERCIAL

## 2023-03-22 VITALS
BODY MASS INDEX: 29.84 KG/M2 | TEMPERATURE: 97.4 F | WEIGHT: 148 LBS | DIASTOLIC BLOOD PRESSURE: 103 MMHG | RESPIRATION RATE: 16 BRPM | HEART RATE: 97 BPM | HEIGHT: 59 IN | OXYGEN SATURATION: 99 % | SYSTOLIC BLOOD PRESSURE: 167 MMHG

## 2023-03-22 DIAGNOSIS — Z48.02 VISIT FOR SUTURE REMOVAL: Primary | ICD-10-CM

## 2023-03-22 DIAGNOSIS — S61.211D LACERATION OF LEFT INDEX FINGER WITHOUT FOREIGN BODY WITHOUT DAMAGE TO NAIL, SUBSEQUENT ENCOUNTER: ICD-10-CM

## 2023-03-22 PROCEDURE — 99283 EMERGENCY DEPT VISIT LOW MDM: CPT

## 2023-03-22 PROCEDURE — 73140 X-RAY EXAM OF FINGER(S): CPT

## 2023-03-22 PROCEDURE — 6370000000 HC RX 637 (ALT 250 FOR IP): Performed by: NURSE PRACTITIONER

## 2023-03-22 RX ORDER — LIDOCAINE HYDROCHLORIDE 10 MG/ML
20 INJECTION, SOLUTION INFILTRATION; PERINEURAL ONCE
Status: DISCONTINUED | OUTPATIENT
Start: 2023-03-22 | End: 2023-03-22 | Stop reason: HOSPADM

## 2023-03-22 RX ORDER — CEPHALEXIN 500 MG/1
500 CAPSULE ORAL 4 TIMES DAILY
Qty: 40 CAPSULE | Refills: 0 | Status: SHIPPED | OUTPATIENT
Start: 2023-03-22 | End: 2023-04-01

## 2023-03-22 RX ORDER — CEPHALEXIN 500 MG/1
500 CAPSULE ORAL ONCE
Status: COMPLETED | OUTPATIENT
Start: 2023-03-22 | End: 2023-03-22

## 2023-03-22 RX ORDER — HYDROCODONE BITARTRATE AND ACETAMINOPHEN 5; 325 MG/1; MG/1
1 TABLET ORAL ONCE
Status: COMPLETED | OUTPATIENT
Start: 2023-03-22 | End: 2023-03-22

## 2023-03-22 RX ORDER — ACETAMINOPHEN 500 MG
1000 TABLET ORAL ONCE
Status: DISCONTINUED | OUTPATIENT
Start: 2023-03-22 | End: 2023-03-22

## 2023-03-22 RX ORDER — ACETAMINOPHEN 500 MG
500 TABLET ORAL 4 TIMES DAILY PRN
Qty: 20 TABLET | Refills: 1 | Status: SHIPPED | OUTPATIENT
Start: 2023-03-22

## 2023-03-22 RX ADMIN — CEPHALEXIN 500 MG: 500 CAPSULE ORAL at 21:36

## 2023-03-22 RX ADMIN — HYDROCODONE BITARTRATE AND ACETAMINOPHEN 1 TABLET: 5; 325 TABLET ORAL at 21:36

## 2023-03-22 ASSESSMENT — PAIN SCALES - GENERAL: PAINLEVEL_OUTOF10: 6

## 2023-03-22 ASSESSMENT — PAIN - FUNCTIONAL ASSESSMENT: PAIN_FUNCTIONAL_ASSESSMENT: 0-10

## 2023-03-22 ASSESSMENT — PAIN DESCRIPTION - LOCATION: LOCATION: FINGER (COMMENT WHICH ONE)

## 2023-03-22 ASSESSMENT — LIFESTYLE VARIABLES
HOW OFTEN DO YOU HAVE A DRINK CONTAINING ALCOHOL: MONTHLY OR LESS
HOW MANY STANDARD DRINKS CONTAINING ALCOHOL DO YOU HAVE ON A TYPICAL DAY: 1 OR 2

## 2023-03-22 ASSESSMENT — PAIN DESCRIPTION - ORIENTATION: ORIENTATION: LEFT

## 2023-03-22 ASSESSMENT — PAIN DESCRIPTION - DESCRIPTORS: DESCRIPTORS: ACHING

## 2023-03-22 NOTE — ED PROVIDER NOTES
[]   Bleeding      []   Fever/Chills     ROS   Pertinent positives and negatives are stated within HPI, all other systems reviewed and are negative. Past Medical History:  has a past medical history of Abnormal Pap smear, Back pain, Bronchitis, Chronic back pain, Endometriosis, H/O colposcopy with cervical biopsy, Headache, Hypertension, Placenta previa, Pregnancy induced hypertension, and Thyroid disease. Surgical History:  has a past surgical history that includes Ovary removal; [place] tooth extraction; Tubal ligation (2013); Hysterectomy (2013); and hernia repair. Social History:  reports that she quit smoking about 7 years ago. Her smoking use included cigarettes. She has never used smokeless tobacco. She reports that she does not drink alcohol and does not use drugs. Family History: family history includes High Blood Pressure in her mother. Allergies: Aspirin and Nsaids    Physical Exam   Oxygen Saturation Interpretation: Normal.        ED Triage Vitals   BP Temp Temp src Pulse Resp SpO2 Height Weight   167/103 97.4 °F -- 97 16 99% -- --         Constitutional:  Alert, development consistent with age. HEENT:  NC/NT. Airway patent. Neck:  Normal ROM. Supple. Respiratory:  Clear to auscultation and breath sounds equal.    CV: Regular rate and rhythm, normal heart sounds, without pathological murmurs, ectopy, gallops, or rubs. GI:  Abdomen Soft, nontender, good bowel sounds. No firm or pulsatile mass. Integument:  No rashes, erythema present. Tenderness to the left distal fingertip unable to flex at the DIP joint. 1 cm laceration with stitches with no drainage. Lymphatics: No lymphangitis or adenopathy noted. Extremities:  no tenderness, swelling or wounds. Neurological:  Oriented. Motor functions intact. Annual nerves II through XII grossly intact.     Lab / Imaging Results   (All laboratory and radiology results have been personally reviewed by myself)  Labs:  No results found

## 2023-03-22 NOTE — Clinical Note
Kumar Yates was seen and treated in our emergency department on 3/22/2023. She may return to work on 03/25/2023. If you have any questions or concerns, please don't hesitate to call.       Lisa Knight, DIONNE - CNP

## 2023-03-29 ENCOUNTER — OFFICE VISIT (OUTPATIENT)
Dept: ORTHOPEDIC SURGERY | Age: 38
End: 2023-03-29
Payer: COMMERCIAL

## 2023-03-29 VITALS — HEIGHT: 59 IN | WEIGHT: 148 LBS | BODY MASS INDEX: 29.84 KG/M2

## 2023-03-29 DIAGNOSIS — M79.642 LEFT HAND PAIN: Primary | ICD-10-CM

## 2023-03-29 PROCEDURE — 99203 OFFICE O/P NEW LOW 30 MIN: CPT | Performed by: FAMILY MEDICINE

## 2023-03-29 PROCEDURE — G8484 FLU IMMUNIZE NO ADMIN: HCPCS | Performed by: FAMILY MEDICINE

## 2023-03-29 PROCEDURE — G8427 DOCREV CUR MEDS BY ELIG CLIN: HCPCS | Performed by: FAMILY MEDICINE

## 2023-03-29 PROCEDURE — G8417 CALC BMI ABV UP PARAM F/U: HCPCS | Performed by: FAMILY MEDICINE

## 2023-03-29 PROCEDURE — 1036F TOBACCO NON-USER: CPT | Performed by: FAMILY MEDICINE

## 2023-03-29 RX ORDER — MELOXICAM 15 MG/1
15 TABLET ORAL DAILY PRN
Qty: 30 TABLET | Refills: 0 | Status: SHIPPED | OUTPATIENT
Start: 2023-03-29

## 2023-03-29 NOTE — PROGRESS NOTES
oral meloxicam which was electronically prescribed to pharmacy today, patient was advised to discontinue all other NSAIDs. Patient will follow up in 1 weeks for reevaluation of symptoms and consider escalation therapy should symptoms persist.  Patient is agreeable with above plan all questions and concerns were addressed in the office today. - meloxicam (MOBIC) 15 MG tablet; Take 1 tablet by mouth daily as needed for Pain  Dispense: 30 tablet; Refill: 0    Return to Office: Return in about 1 week (around 4/5/2023) for re-evaluation.     Kiana Esquivel MD

## 2023-06-16 ENCOUNTER — HOSPITAL ENCOUNTER (OUTPATIENT)
Dept: ULTRASOUND IMAGING | Age: 38
Discharge: HOME | End: 2023-06-18
Payer: COMMERCIAL

## 2023-06-16 DIAGNOSIS — E04.2 MULTINODULAR GOITER: ICD-10-CM

## 2023-06-16 PROCEDURE — 76536 US EXAM OF HEAD AND NECK: CPT

## 2023-06-19 ENCOUNTER — TELEPHONE (OUTPATIENT)
Dept: ENDOCRINOLOGY | Age: 38
End: 2023-06-19

## 2023-06-20 NOTE — TELEPHONE ENCOUNTER
Endocrine staff,   Please notify pt that I have reviewed your recent results. Excellent! Thyroid US showed that your thyroid nodules are very small and don't meet criteria for FNA at this time.  Will discuss this in details at next OV

## 2023-06-25 ENCOUNTER — HOSPITAL ENCOUNTER (EMERGENCY)
Age: 38
Discharge: HOME | End: 2023-06-25
Attending: EMERGENCY MEDICINE
Payer: COMMERCIAL

## 2023-06-25 VITALS
SYSTOLIC BLOOD PRESSURE: 127 MMHG | DIASTOLIC BLOOD PRESSURE: 96 MMHG | OXYGEN SATURATION: 98 % | TEMPERATURE: 97.9 F | RESPIRATION RATE: 16 BRPM | HEART RATE: 97 BPM

## 2023-06-25 DIAGNOSIS — S61.215A LACERATION OF LEFT RING FINGER WITHOUT FOREIGN BODY, NAIL DAMAGE STATUS UNSPECIFIED, INITIAL ENCOUNTER: Primary | ICD-10-CM

## 2023-06-25 PROCEDURE — 12002 RPR S/N/AX/GEN/TRNK2.6-7.5CM: CPT

## 2023-06-25 PROCEDURE — 99282 EMERGENCY DEPT VISIT SF MDM: CPT

## 2023-06-25 ASSESSMENT — ENCOUNTER SYMPTOMS
SHORTNESS OF BREATH: 0
VOICE CHANGE: 0
ABDOMINAL PAIN: 0
COUGH: 0
BACK PAIN: 0
RHINORRHEA: 0
DIARRHEA: 0
VOMITING: 0
PHOTOPHOBIA: 0
TROUBLE SWALLOWING: 0
NAUSEA: 0
WHEEZING: 0

## 2023-07-05 ENCOUNTER — HOSPITAL ENCOUNTER (EMERGENCY)
Age: 38
Discharge: HOME | End: 2023-07-05
Payer: COMMERCIAL

## 2023-07-05 VITALS
SYSTOLIC BLOOD PRESSURE: 153 MMHG | HEIGHT: 59 IN | WEIGHT: 144 LBS | OXYGEN SATURATION: 100 % | BODY MASS INDEX: 29.03 KG/M2 | RESPIRATION RATE: 16 BRPM | HEART RATE: 86 BPM | TEMPERATURE: 98.2 F | DIASTOLIC BLOOD PRESSURE: 13 MMHG

## 2023-07-05 DIAGNOSIS — Z48.02 VISIT FOR SUTURE REMOVAL: Primary | ICD-10-CM

## 2023-07-05 PROCEDURE — 99282 EMERGENCY DEPT VISIT SF MDM: CPT

## 2023-09-30 ENCOUNTER — HOSPITAL ENCOUNTER (EMERGENCY)
Age: 38
Discharge: HOME | End: 2023-09-30
Attending: STUDENT IN AN ORGANIZED HEALTH CARE EDUCATION/TRAINING PROGRAM
Payer: COMMERCIAL

## 2023-09-30 VITALS
HEART RATE: 80 BPM | SYSTOLIC BLOOD PRESSURE: 148 MMHG | RESPIRATION RATE: 16 BRPM | TEMPERATURE: 98.4 F | WEIGHT: 148 LBS | BODY MASS INDEX: 29.84 KG/M2 | HEIGHT: 59 IN | DIASTOLIC BLOOD PRESSURE: 98 MMHG | OXYGEN SATURATION: 98 %

## 2023-09-30 DIAGNOSIS — Z20.818 STREP THROAT EXPOSURE: ICD-10-CM

## 2023-09-30 DIAGNOSIS — J06.9 ACUTE UPPER RESPIRATORY INFECTION: Primary | ICD-10-CM

## 2023-09-30 LAB
FLUAV RNA RESP QL NAA+PROBE: NOT DETECTED
FLUBV RNA RESP QL NAA+PROBE: NOT DETECTED
SARS-COV-2 RNA RESP QL NAA+PROBE: NOT DETECTED
SOURCE: NORMAL
SPECIMEN DESCRIPTION: NORMAL

## 2023-09-30 PROCEDURE — 87636 SARSCOV2 & INF A&B AMP PRB: CPT

## 2023-09-30 PROCEDURE — 99283 EMERGENCY DEPT VISIT LOW MDM: CPT

## 2023-09-30 RX ORDER — LISINOPRIL 20 MG/1
20 TABLET ORAL DAILY
COMMUNITY

## 2023-09-30 RX ORDER — LISINOPRIL AND HYDROCHLOROTHIAZIDE 25; 20 MG/1; MG/1
1 TABLET ORAL DAILY
COMMUNITY

## 2023-09-30 RX ORDER — BUDESONIDE AND FORMOTEROL FUMARATE DIHYDRATE 80; 4.5 UG/1; UG/1
2 AEROSOL RESPIRATORY (INHALATION)
COMMUNITY

## 2023-09-30 RX ORDER — BROMPHENIRAMINE MALEATE, PSEUDOEPHEDRINE HYDROCHLORIDE, AND DEXTROMETHORPHAN HYDROBROMIDE 2; 30; 10 MG/5ML; MG/5ML; MG/5ML
5 SYRUP ORAL 3 TIMES DAILY PRN
Qty: 118 ML | Refills: 0 | Status: SHIPPED | OUTPATIENT
Start: 2023-09-30

## 2023-09-30 RX ORDER — ALBUTEROL SULFATE 90 UG/1
2 AEROSOL, METERED RESPIRATORY (INHALATION) EVERY 6 HOURS PRN
COMMUNITY

## 2023-09-30 RX ORDER — AMOXICILLIN 500 MG/1
500 CAPSULE ORAL 2 TIMES DAILY
Qty: 20 CAPSULE | Refills: 0 | Status: SHIPPED | OUTPATIENT
Start: 2023-09-30 | End: 2023-10-10

## 2023-09-30 ASSESSMENT — ENCOUNTER SYMPTOMS
SORE THROAT: 1
VOMITING: 0
SHORTNESS OF BREATH: 0
COLOR CHANGE: 0
ABDOMINAL PAIN: 0
DIARRHEA: 0
COUGH: 1
NAUSEA: 0
BACK PAIN: 0

## 2023-09-30 ASSESSMENT — PAIN DESCRIPTION - LOCATION: LOCATION: HEAD

## 2023-09-30 ASSESSMENT — PAIN DESCRIPTION - PAIN TYPE: TYPE: ACUTE PAIN

## 2023-09-30 ASSESSMENT — PAIN SCALES - GENERAL: PAINLEVEL_OUTOF10: 8

## 2023-09-30 ASSESSMENT — PAIN DESCRIPTION - DESCRIPTORS: DESCRIPTORS: ACHING

## 2023-11-30 ENCOUNTER — APPOINTMENT (OUTPATIENT)
Dept: GENERAL RADIOLOGY | Age: 38
End: 2023-11-30
Payer: COMMERCIAL

## 2023-11-30 ENCOUNTER — HOSPITAL ENCOUNTER (EMERGENCY)
Age: 38
Discharge: HOME | End: 2023-11-30
Attending: EMERGENCY MEDICINE
Payer: COMMERCIAL

## 2023-11-30 VITALS
RESPIRATION RATE: 16 BRPM | DIASTOLIC BLOOD PRESSURE: 85 MMHG | SYSTOLIC BLOOD PRESSURE: 117 MMHG | BODY MASS INDEX: 29.23 KG/M2 | HEIGHT: 59 IN | WEIGHT: 145 LBS | OXYGEN SATURATION: 99 % | HEART RATE: 80 BPM | TEMPERATURE: 98.1 F

## 2023-11-30 DIAGNOSIS — J18.9 ATYPICAL PNEUMONIA: Primary | ICD-10-CM

## 2023-11-30 LAB
ALBUMIN SERPL-MCNC: 4.6 G/DL (ref 3.5–5.2)
ALP SERPL-CCNC: 70 U/L (ref 35–104)
ALT SERPL-CCNC: 15 U/L (ref 0–32)
ANION GAP SERPL CALCULATED.3IONS-SCNC: 14 MMOL/L (ref 7–16)
AST SERPL-CCNC: 18 U/L (ref 0–31)
BASOPHILS # BLD: 0.04 K/UL (ref 0–0.2)
BASOPHILS NFR BLD: 0 % (ref 0–2)
BILIRUB SERPL-MCNC: 0.3 MG/DL (ref 0–1.2)
BUN SERPL-MCNC: 13 MG/DL (ref 6–20)
CALCIUM SERPL-MCNC: 9.7 MG/DL (ref 8.6–10.2)
CHLORIDE SERPL-SCNC: 100 MMOL/L (ref 98–107)
CO2 SERPL-SCNC: 26 MMOL/L (ref 22–29)
CREAT SERPL-MCNC: 0.7 MG/DL (ref 0.5–1)
EOSINOPHIL # BLD: 0.02 K/UL (ref 0.05–0.5)
EOSINOPHILS RELATIVE PERCENT: 0 % (ref 0–6)
ERYTHROCYTE [DISTWIDTH] IN BLOOD BY AUTOMATED COUNT: 12.5 % (ref 11.5–15)
GFR SERPL CREATININE-BSD FRML MDRD: >60 ML/MIN/1.73M2
GLUCOSE SERPL-MCNC: 94 MG/DL (ref 74–99)
HCT VFR BLD AUTO: 39.9 % (ref 34–48)
HGB BLD-MCNC: 13.7 G/DL (ref 11.5–15.5)
IMM GRANULOCYTES # BLD AUTO: 0.04 K/UL (ref 0–0.58)
IMM GRANULOCYTES NFR BLD: 0 % (ref 0–5)
LACTATE BLDV-SCNC: 0.8 MMOL/L (ref 0.5–2.2)
LYMPHOCYTES NFR BLD: 4.15 K/UL (ref 1.5–4)
LYMPHOCYTES RELATIVE PERCENT: 34 % (ref 20–42)
MCH RBC QN AUTO: 31 PG (ref 26–35)
MCHC RBC AUTO-ENTMCNC: 34.3 G/DL (ref 32–34.5)
MCV RBC AUTO: 90.3 FL (ref 80–99.9)
MONOCYTES NFR BLD: 0.71 K/UL (ref 0.1–0.95)
MONOCYTES NFR BLD: 6 % (ref 2–12)
NEUTROPHILS NFR BLD: 59 % (ref 43–80)
NEUTS SEG NFR BLD: 7.16 K/UL (ref 1.8–7.3)
PLATELET # BLD AUTO: 184 K/UL (ref 130–450)
PMV BLD AUTO: 10.9 FL (ref 7–12)
POTASSIUM SERPL-SCNC: 3.6 MMOL/L (ref 3.5–5)
PROT SERPL-MCNC: 7.9 G/DL (ref 6.4–8.3)
RBC # BLD AUTO: 4.42 M/UL (ref 3.5–5.5)
SODIUM SERPL-SCNC: 140 MMOL/L (ref 132–146)
WBC OTHER # BLD: 12.1 K/UL (ref 4.5–11.5)

## 2023-11-30 PROCEDURE — 80053 COMPREHEN METABOLIC PANEL: CPT

## 2023-11-30 PROCEDURE — 87040 BLOOD CULTURE FOR BACTERIA: CPT

## 2023-11-30 PROCEDURE — 2580000003 HC RX 258: Performed by: EMERGENCY MEDICINE

## 2023-11-30 PROCEDURE — 83605 ASSAY OF LACTIC ACID: CPT

## 2023-11-30 PROCEDURE — 6360000002 HC RX W HCPCS: Performed by: EMERGENCY MEDICINE

## 2023-11-30 PROCEDURE — 99284 EMERGENCY DEPT VISIT MOD MDM: CPT

## 2023-11-30 PROCEDURE — 6370000000 HC RX 637 (ALT 250 FOR IP): Performed by: EMERGENCY MEDICINE

## 2023-11-30 PROCEDURE — 96365 THER/PROPH/DIAG IV INF INIT: CPT

## 2023-11-30 PROCEDURE — 2500000003 HC RX 250 WO HCPCS: Performed by: EMERGENCY MEDICINE

## 2023-11-30 PROCEDURE — 85025 COMPLETE CBC W/AUTO DIFF WBC: CPT

## 2023-11-30 PROCEDURE — 96366 THER/PROPH/DIAG IV INF ADDON: CPT

## 2023-11-30 PROCEDURE — 71046 X-RAY EXAM CHEST 2 VIEWS: CPT

## 2023-11-30 PROCEDURE — 96375 TX/PRO/DX INJ NEW DRUG ADDON: CPT

## 2023-11-30 RX ORDER — DOXYCYCLINE HYCLATE 100 MG
100 TABLET ORAL 2 TIMES DAILY
Qty: 20 TABLET | Refills: 0 | Status: SHIPPED | OUTPATIENT
Start: 2023-11-30 | End: 2023-12-10

## 2023-11-30 RX ORDER — CEFDINIR 300 MG/1
300 CAPSULE ORAL 2 TIMES DAILY
Qty: 20 CAPSULE | Refills: 0 | Status: SHIPPED | OUTPATIENT
Start: 2023-11-30 | End: 2023-12-10

## 2023-11-30 RX ORDER — GUAIFENESIN/DEXTROMETHORPHAN 100-10MG/5
5 SYRUP ORAL ONCE
Status: COMPLETED | OUTPATIENT
Start: 2023-11-30 | End: 2023-11-30

## 2023-11-30 RX ORDER — BENZONATATE 100 MG/1
100 CAPSULE ORAL 3 TIMES DAILY PRN
Qty: 30 CAPSULE | Refills: 0 | Status: SHIPPED | OUTPATIENT
Start: 2023-11-30 | End: 2023-12-10

## 2023-11-30 RX ORDER — 0.9 % SODIUM CHLORIDE 0.9 %
1000 INTRAVENOUS SOLUTION INTRAVENOUS ONCE
Status: COMPLETED | OUTPATIENT
Start: 2023-11-30 | End: 2023-11-30

## 2023-11-30 RX ORDER — IPRATROPIUM BROMIDE AND ALBUTEROL SULFATE 2.5; .5 MG/3ML; MG/3ML
1 SOLUTION RESPIRATORY (INHALATION) ONCE
Status: COMPLETED | OUTPATIENT
Start: 2023-11-30 | End: 2023-11-30

## 2023-11-30 RX ADMIN — IPRATROPIUM BROMIDE AND ALBUTEROL SULFATE 1 DOSE: .5; 3 SOLUTION RESPIRATORY (INHALATION) at 10:25

## 2023-11-30 RX ADMIN — GUAIFENESIN SYRUP AND DEXTROMETHORPHAN 5 ML: 100; 10 SYRUP ORAL at 10:24

## 2023-11-30 RX ADMIN — SODIUM CHLORIDE 1000 ML: 9 INJECTION, SOLUTION INTRAVENOUS at 10:30

## 2023-11-30 RX ADMIN — DOXYCYCLINE 100 MG: 100 INJECTION, POWDER, LYOPHILIZED, FOR SOLUTION INTRAVENOUS at 10:38

## 2023-11-30 RX ADMIN — WATER 1000 MG: 1 INJECTION INTRAMUSCULAR; INTRAVENOUS; SUBCUTANEOUS at 10:26

## 2023-11-30 ASSESSMENT — PAIN DESCRIPTION - FREQUENCY: FREQUENCY: CONTINUOUS

## 2023-11-30 ASSESSMENT — PAIN DESCRIPTION - PAIN TYPE: TYPE: ACUTE PAIN

## 2023-11-30 ASSESSMENT — PAIN DESCRIPTION - LOCATION: LOCATION: CHEST

## 2023-11-30 ASSESSMENT — PAIN DESCRIPTION - ONSET: ONSET: ON-GOING

## 2023-11-30 ASSESSMENT — PAIN DESCRIPTION - DESCRIPTORS: DESCRIPTORS: TENDER;SORE

## 2023-11-30 ASSESSMENT — PAIN - FUNCTIONAL ASSESSMENT: PAIN_FUNCTIONAL_ASSESSMENT: 0-10

## 2023-11-30 ASSESSMENT — PAIN SCALES - GENERAL: PAINLEVEL_OUTOF10: 7

## 2023-11-30 NOTE — ED PROVIDER NOTES
[address] ENCOUNTER        Pt Name: Ildefonso Kincaid  MRN: [id number]  [address] [facility] 1985  Date of evaluation: 11/30/2023  Provider: Marichuy Martin DO  PCP: Kishan Devine MD  Note Started: 8:48 AM EST 11/30/23    CHIEF COMPLAINT       Chief Complaint   Patient presents with    URI     Feeling sick since around 11/20. Seen at Urgent Care this past Monday. Negative for Covid, flu and strep. Was given a steroid. Still not feeling any better. +Cough, nasal congestion, sore throat, vomiting. HISTORY OF PRESENT ILLNESS: 1 or more Elements   History From: patient    Limitations to history : None    Ildefonso Kincaid is a 34 y.o. female who presents with cough productive of green phlegm, sore throat, congestion, subjective fatigue and body aches beginning 10 days ago (11/20). She went to [facility] 4 days ago and had negative COVID, flu and strep swabs. She was given a breathing machine, nebulizer treatments, cough medicine which was on backorder and steroids. None of which is helping. She presents today for further treatment and investigation. While in the ER patient received a phone call from [facility] [employer] stating that her nasal swab was positive for strep pneumonia and Klebsiella. The complaint has been persistent, moderate in severity, and worsened by nothing. Patient denies chest pain, shortness of breath, edema, headache, visual disturbances, focal paresthesias, focal weakness, abdominal pain, nausea, vomiting, diarrhea, constipation, dysuria, hematuria, trauma, neck or back pain, rash or other complaints. Nursing Notes were all reviewed and agreed with or any disagreements were addressed in the HPI. REVIEW OF SYSTEMS :           Positives and Pertinent negatives as per HPI.      SURGICAL HISTORY     Past Surgical History:   Procedure Laterality Date    HERNIA REPAIR      HYSTERECTOMY (CERVIX STATUS UNKNOWN)  2013    OVARY

## 2023-12-03 LAB
MICROORGANISM SPEC CULT: NORMAL
MICROORGANISM SPEC CULT: NORMAL
SERVICE CMNT-IMP: NORMAL
SERVICE CMNT-IMP: NORMAL
SPECIMEN DESCRIPTION: NORMAL
SPECIMEN DESCRIPTION: NORMAL

## 2024-02-08 ENCOUNTER — OFFICE VISIT (OUTPATIENT)
Dept: ENDOCRINOLOGY | Age: 39
End: 2024-02-08
Payer: COMMERCIAL

## 2024-02-08 VITALS
WEIGHT: 150 LBS | BODY MASS INDEX: 30.24 KG/M2 | SYSTOLIC BLOOD PRESSURE: 125 MMHG | DIASTOLIC BLOOD PRESSURE: 89 MMHG | RESPIRATION RATE: 18 BRPM | HEART RATE: 89 BPM | OXYGEN SATURATION: 99 % | HEIGHT: 59 IN

## 2024-02-08 DIAGNOSIS — E04.2 MULTINODULAR GOITER: ICD-10-CM

## 2024-02-08 DIAGNOSIS — E06.9 THYROIDITIS: Primary | ICD-10-CM

## 2024-02-08 DIAGNOSIS — E55.9 VITAMIN D DEFICIENCY: ICD-10-CM

## 2024-02-08 PROCEDURE — 99214 OFFICE O/P EST MOD 30 MIN: CPT | Performed by: INTERNAL MEDICINE

## 2024-02-08 PROCEDURE — G8484 FLU IMMUNIZE NO ADMIN: HCPCS | Performed by: INTERNAL MEDICINE

## 2024-02-08 PROCEDURE — 1036F TOBACCO NON-USER: CPT | Performed by: INTERNAL MEDICINE

## 2024-02-08 PROCEDURE — G8427 DOCREV CUR MEDS BY ELIG CLIN: HCPCS | Performed by: INTERNAL MEDICINE

## 2024-02-08 PROCEDURE — G8417 CALC BMI ABV UP PARAM F/U: HCPCS | Performed by: INTERNAL MEDICINE

## 2024-02-08 NOTE — PROGRESS NOTES
[facility] Department of Endocrinology Diabetes and Metabolism   [address] [place] [address]   Phone: [phone]  Fax: [phone]    Date of Service: 2/8/2024  Primary Care Physician: Alexis Bazzi MD  Provider: Rajesh Fonseca MD    Reason for the visit:  Hyperthyroidism, MNG     History of Present Illness:  The history is provided by the patient. No  was used. Accuracy of the patient data is excellent.    Flori Dyer is a very pleasant 38 y.o. female seen today for evaluation and management of hyperthyroidism     The patient told me that she was diagnosed with hyperthyroidism about 2 years ago but never required treatment   She has thyroid US and NM thyroid Uptake&scan in 2018 , result not available     6/2020 - Thyroid US small 4 mm hypoechoic nodule in Left lobe     Labs 3/2020 - TSH mildly suppressed at 0.33, free T4 normal 1.26  10/2020 - TSH 0.396, FT4 1.25   8/2021 - TSH 0.304, FT4 1.18   Lab Results   Component Value Date/Time    TSH 0.393 02/08/2022 09:20 AM    T4FREE 1.25 02/08/2022 09:20 AM       Lab Results   Component Value Date/Time    TSI <0.10 06/18/2020 02:32 PM    TPOABS 3.1 06/18/2020 02:32 PM    THGAB <0.9 06/18/2020 02:32 PM     This patient the patient is still complaining of a lot of tiredness and fatigue.    PAST MEDICAL HISTORY   Past Medical History:   Diagnosis Date    Abnormal Pap smear     Back pain     Bronchitis     Chronic back pain     Endometriosis     H/O colposcopy with cervical biopsy     Headache     Hypertension     Placenta previa     Current pregnancy    Pregnancy induced hypertension     Thyroid disease     warm nodule & goiter       PAST SURGICAL HISTORY   Past Surgical History:   Procedure Laterality Date    HERNIA REPAIR      HYSTERECTOMY (CERVIX STATUS UNKNOWN)  2013    OVARY REMOVAL      TUBAL LIGATION  2013    WISDOM TOOTH EXTRACTION         SOCIAL HISTORY   Tobacco:   reports that she quit smoking about

## 2024-02-21 ENCOUNTER — HOSPITAL ENCOUNTER (OUTPATIENT)
Age: 39
Discharge: HOME | End: 2024-02-21
Payer: COMMERCIAL

## 2024-02-21 ENCOUNTER — TELEPHONE (OUTPATIENT)
Dept: ENDOCRINOLOGY | Age: 39
End: 2024-02-21

## 2024-02-21 DIAGNOSIS — E55.9 VITAMIN D DEFICIENCY: ICD-10-CM

## 2024-02-21 DIAGNOSIS — E06.9 THYROIDITIS: ICD-10-CM

## 2024-02-21 LAB
25(OH)D3 SERPL-MCNC: 47.4 NG/ML (ref 30–100)
T4 FREE SERPL-MCNC: 1.4 NG/DL (ref 0.9–1.7)
TSH SERPL DL<=0.05 MIU/L-ACNC: 0.47 UIU/ML (ref 0.27–4.2)

## 2024-02-21 PROCEDURE — 36415 COLL VENOUS BLD VENIPUNCTURE: CPT

## 2024-02-21 PROCEDURE — 84443 ASSAY THYROID STIM HORMONE: CPT

## 2024-02-21 PROCEDURE — 84439 ASSAY OF FREE THYROXINE: CPT

## 2024-02-21 PROCEDURE — 82306 VITAMIN D 25 HYDROXY: CPT

## 2024-02-22 ENCOUNTER — HOSPITAL ENCOUNTER (EMERGENCY)
Age: 39
Discharge: HOME | End: 2024-02-22
Attending: EMERGENCY MEDICINE
Payer: COMMERCIAL

## 2024-02-22 VITALS
DIASTOLIC BLOOD PRESSURE: 91 MMHG | OXYGEN SATURATION: 100 % | RESPIRATION RATE: 18 BRPM | TEMPERATURE: 97.5 F | SYSTOLIC BLOOD PRESSURE: 128 MMHG | HEART RATE: 100 BPM

## 2024-02-22 DIAGNOSIS — M43.6 TORTICOLLIS: ICD-10-CM

## 2024-02-22 DIAGNOSIS — M62.838 SPASM OF MUSCLE: ICD-10-CM

## 2024-02-22 DIAGNOSIS — S16.1XXA STRAIN OF NECK MUSCLE, INITIAL ENCOUNTER: ICD-10-CM

## 2024-02-22 DIAGNOSIS — M54.2 NECK PAIN: Primary | ICD-10-CM

## 2024-02-22 PROCEDURE — 6360000002 HC RX W HCPCS: Performed by: EMERGENCY MEDICINE

## 2024-02-22 PROCEDURE — 96372 THER/PROPH/DIAG INJ SC/IM: CPT

## 2024-02-22 PROCEDURE — 96374 THER/PROPH/DIAG INJ IV PUSH: CPT

## 2024-02-22 PROCEDURE — 6370000000 HC RX 637 (ALT 250 FOR IP): Performed by: EMERGENCY MEDICINE

## 2024-02-22 PROCEDURE — 99284 EMERGENCY DEPT VISIT MOD MDM: CPT

## 2024-02-22 RX ORDER — KETOROLAC TROMETHAMINE 30 MG/ML
60 INJECTION, SOLUTION INTRAMUSCULAR; INTRAVENOUS ONCE
Status: COMPLETED | OUTPATIENT
Start: 2024-02-22 | End: 2024-02-22

## 2024-02-22 RX ORDER — OXYCODONE HYDROCHLORIDE AND ACETAMINOPHEN 5; 325 MG/1; MG/1
1 TABLET ORAL EVERY 6 HOURS PRN
Qty: 12 TABLET | Refills: 0 | Status: SHIPPED | OUTPATIENT
Start: 2024-02-22 | End: 2024-02-29

## 2024-02-22 RX ORDER — OXYCODONE HYDROCHLORIDE AND ACETAMINOPHEN 5; 325 MG/1; MG/1
1 TABLET ORAL ONCE
Status: COMPLETED | OUTPATIENT
Start: 2024-02-22 | End: 2024-02-22

## 2024-02-22 RX ORDER — DEXAMETHASONE SODIUM PHOSPHATE 10 MG/ML
10 INJECTION INTRAMUSCULAR; INTRAVENOUS ONCE
Status: COMPLETED | OUTPATIENT
Start: 2024-02-22 | End: 2024-02-22

## 2024-02-22 RX ORDER — ONDANSETRON 4 MG/1
4 TABLET, ORALLY DISINTEGRATING ORAL ONCE
Status: COMPLETED | OUTPATIENT
Start: 2024-02-22 | End: 2024-02-22

## 2024-02-22 RX ORDER — METHYLPREDNISOLONE 4 MG/1
TABLET ORAL
Qty: 1 KIT | Status: SHIPPED | OUTPATIENT
Start: 2024-02-22 | End: 2024-02-28

## 2024-02-22 RX ADMIN — KETOROLAC TROMETHAMINE 60 MG: 30 INJECTION, SOLUTION INTRAMUSCULAR; INTRAVENOUS at 01:44

## 2024-02-22 RX ADMIN — ONDANSETRON 4 MG: 4 TABLET, ORALLY DISINTEGRATING ORAL at 01:46

## 2024-02-22 RX ADMIN — OXYCODONE HYDROCHLORIDE AND ACETAMINOPHEN 1 TABLET: 5; 325 TABLET ORAL at 01:46

## 2024-02-22 RX ADMIN — DEXAMETHASONE SODIUM PHOSPHATE 10 MG: 10 INJECTION INTRAMUSCULAR; INTRAVENOUS at 01:46

## 2024-02-22 ASSESSMENT — PAIN DESCRIPTION - DESCRIPTORS: DESCRIPTORS: ACHING

## 2024-02-22 ASSESSMENT — PAIN DESCRIPTION - LOCATION: LOCATION: NECK

## 2024-02-22 ASSESSMENT — PAIN DESCRIPTION - ORIENTATION: ORIENTATION: POSTERIOR

## 2024-02-22 ASSESSMENT — PAIN SCALES - GENERAL: PAINLEVEL_OUTOF10: 10

## 2024-02-22 NOTE — ED PROVIDER NOTES
she is actually not allergic to NSAIDs and aspirin just makes her nauseated I gave her Zofran with it I gave her also Decadron and she is she has been urinating clear no signs of rhabdo my lysis and splinting and patient had no trauma she just woke up with neck pain is worse with palpation over the neck no neck swelling itself oropharynx is unremarkable    Counseling:   The emergency provider has spoken with the patient and discussed today’s results, in addition to providing specific details for the plan of care and counseling regarding the diagnosis and prognosis.  Questions are answered at this time and they are agreeable with the plan.      --------------------------------- IMPRESSION AND DISPOSITION ---------------------------------    IMPRESSION  1. Neck pain    2. Torticollis    3. Strain of neck muscle, initial encounter    4. Spasm of muscle        DISPOSITION  Disposition: Discharge to home  Patient condition is stable                  Chapin Richard MD  02/22/24 0118

## 2024-02-22 NOTE — DISCHARGE INSTRUCTIONS
Immediately for increased neurodeficits or increased fevers vomiting change in vision or swelling of your neck you should get an MRI of your neck as soon as possible   Unable to offer due to clinical condition

## 2024-08-17 ENCOUNTER — HOSPITAL ENCOUNTER (EMERGENCY)
Age: 39
Discharge: HOME | End: 2024-08-17
Attending: EMERGENCY MEDICINE
Payer: COMMERCIAL

## 2024-08-17 VITALS
SYSTOLIC BLOOD PRESSURE: 166 MMHG | OXYGEN SATURATION: 98 % | DIASTOLIC BLOOD PRESSURE: 109 MMHG | WEIGHT: 152.6 LBS | RESPIRATION RATE: 18 BRPM | TEMPERATURE: 98 F | BODY MASS INDEX: 30.82 KG/M2 | HEART RATE: 81 BPM

## 2024-08-17 DIAGNOSIS — S46.811A TRAPEZIUS STRAIN, RIGHT, INITIAL ENCOUNTER: Primary | ICD-10-CM

## 2024-08-17 DIAGNOSIS — M54.6 ACUTE RIGHT-SIDED THORACIC BACK PAIN: ICD-10-CM

## 2024-08-17 PROCEDURE — 99284 EMERGENCY DEPT VISIT MOD MDM: CPT

## 2024-08-17 PROCEDURE — 96372 THER/PROPH/DIAG INJ SC/IM: CPT

## 2024-08-17 PROCEDURE — 6360000002 HC RX W HCPCS: Performed by: EMERGENCY MEDICINE

## 2024-08-17 RX ORDER — METHYLPREDNISOLONE 4 MG/1
TABLET ORAL
Qty: 1 KIT | Refills: 0 | Status: SHIPPED | OUTPATIENT
Start: 2024-08-17 | End: 2024-08-23

## 2024-08-17 RX ORDER — TRIAMCINOLONE ACETONIDE 40 MG/ML
40 INJECTION, SUSPENSION INTRA-ARTICULAR; INTRAMUSCULAR ONCE
Status: COMPLETED | OUTPATIENT
Start: 2024-08-17 | End: 2024-08-17

## 2024-08-17 RX ORDER — HYDROCODONE BITARTRATE AND ACETAMINOPHEN 5; 325 MG/1; MG/1
1 TABLET ORAL EVERY 6 HOURS PRN
Qty: 12 TABLET | Refills: 0 | Status: SHIPPED | OUTPATIENT
Start: 2024-08-17 | End: 2024-08-20

## 2024-08-17 RX ORDER — ORPHENADRINE CITRATE 100 MG/1
100 TABLET, EXTENDED RELEASE ORAL 2 TIMES DAILY PRN
Qty: 20 TABLET | Refills: 0 | Status: SHIPPED | OUTPATIENT
Start: 2024-08-17 | End: 2024-08-27

## 2024-08-17 RX ADMIN — TRIAMCINOLONE ACETONIDE 40 MG: 40 INJECTION, SUSPENSION INTRA-ARTICULAR; INTRAMUSCULAR at 09:05

## 2024-08-17 ASSESSMENT — LIFESTYLE VARIABLES
HOW OFTEN DO YOU HAVE A DRINK CONTAINING ALCOHOL: NEVER
HOW MANY STANDARD DRINKS CONTAINING ALCOHOL DO YOU HAVE ON A TYPICAL DAY: PATIENT DOES NOT DRINK

## 2024-08-17 ASSESSMENT — PAIN DESCRIPTION - DESCRIPTORS: DESCRIPTORS: ACHING;DISCOMFORT;SORE

## 2024-08-17 ASSESSMENT — PAIN SCALES - GENERAL: PAINLEVEL_OUTOF10: 10

## 2024-08-17 ASSESSMENT — PAIN DESCRIPTION - ONSET: ONSET: ON-GOING

## 2024-08-17 ASSESSMENT — PAIN DESCRIPTION - FREQUENCY: FREQUENCY: CONTINUOUS

## 2024-08-17 ASSESSMENT — PAIN DESCRIPTION - ORIENTATION: ORIENTATION: UPPER

## 2024-08-17 ASSESSMENT — PAIN - FUNCTIONAL ASSESSMENT: PAIN_FUNCTIONAL_ASSESSMENT: 0-10

## 2024-08-17 ASSESSMENT — PAIN DESCRIPTION - PAIN TYPE: TYPE: ACUTE PAIN

## 2024-08-17 ASSESSMENT — PAIN DESCRIPTION - LOCATION: LOCATION: BACK

## 2024-08-17 NOTE — ED PROVIDER NOTES
[facility] EMERGENCY DEPARTMENT  EMERGENCY DEPARTMENT ENCOUNTER        Pt Name: Flori Dyer  MRN: [id number]  Birthdate 1985  Date of evaluation: 8/17/2024  Provider: Noemy Lomas DO  PCP: Alexis Bazzi MD  Note Started: 8:59 AM EDT 8/17/24    CHIEF COMPLAINT       Chief Complaint   Patient presents with    Back Pain     Twisted wrong a week ago in back, continues to have pain       HISTORY OF PRESENT ILLNESS: 1 or more Elements   History From: patient    Limitations to history : None    Flori Dyer is a 39 y.o. female who presents with right thoracic muscle pain/strain beginning one week ago after twisting to throw her purse in the backseat.  She denies direct injury, focal paresthesias and focal weakness but does have some pain shooting now into her right shoulder and arm.  She reports she has been using the muscle relaxer she has at home but is not working.  She reports today is her father's 60th birthday and needs better pain control to get through the day.  She used to see pain management for injections but it has been quite sometime since she has been in pain management. The complaint has been persistent, moderate in severity, and worsened by movement.  Patient denies fever/chills, sore throat, cough, congestion, chest pain, shortness of breath, edema, headache, visual disturbances, focal paresthesias, focal weakness, abdominal pain, nausea, vomiting, diarrhea, constipation, dysuria, hematuria, trauma, neck or low back pain, rash or other complaints.  She denies urinary or stool incontinence or retention, saddle paresthesias, fever or chills or direct injury.            Nursing Notes were all reviewed and agreed with or any disagreements were addressed in the HPI.    REVIEW OF SYSTEMS :           Positives and Pertinent negatives as per HPI.     SURGICAL HISTORY     Past Surgical History:   Procedure Laterality Date    HERNIA REPAIR      HYSTERECTOMY (CERVIX          ----------------------------------------PHYSICAL EXAM--------------------------------------  Constitutional:  Well developed, well nourished, no acute distress, non-toxic appearance   Eyes:  PERRL, conjunctiva normal, EOMI  HENT:  Atraumatic, external ears normal, nose normal, oropharynx moist.  Neck- normal range of motion, no nuchal rigidity   Respiratory:  No respiratory distress, normal breath sounds, no rales, no wheezing   Cardiovascular:  Normal rate, normal rhythm, no murmurs, no gallops, no rubs. Radial pulses 2+ bilaterally.   GI:  Soft, nondistended, normal bowel sounds, nontender, no organomegaly, no mass, no rebound, no guarding   :  No costovertebral angle tenderness   Musculoskeletal:  No edema, no tenderness, no deformities. Back- no midline or paraspinal cervical, thoracic or lumbar tenderness.  No step-offs.  Chest stable.  Pelvis stable.  Moves all 4 extremities without difficulty or pain, in particular the right shoulder elbow and wrist.  She has some tenderness to palpation along the entirety of the right trapezius muscle with some mild spasm noted.  Integument:  Well hydrated, no rash. Adequate perfusion.   Neurologic:  Alert & oriented x 3, CN 2-12 normal, no focal deficits noted. Normal gait.  Right axillary nerve intact.  Right median, radial and ulnar nerves sensation intact to light touch and strength 5/5.  Recurrent branch right median nerve intact.  Psychiatric:  Speech and behavior appropriate             DIAGNOSTIC RESULTS   LABS:  No results found for this visit on 08/17/24.    As interpreted by me, the above displayed labs are abnormal as marked by (H) or (L). All other labs obtained during this visit were within normal range or not returned as of this dictation.       PAST MEDICAL HISTORY/Chronic Conditions Affecting Care      has a past medical history of Abnormal Pap smear, Back pain, Bronchitis, Chronic back pain, Endometriosis, H/O colposcopy with cervical biopsy,

## 2024-08-29 ENCOUNTER — HOSPITAL ENCOUNTER (OUTPATIENT)
Dept: ULTRASOUND IMAGING | Age: 39
Discharge: HOME | End: 2024-08-31
Payer: COMMERCIAL

## 2024-08-29 DIAGNOSIS — E04.2 MULTINODULAR GOITER: ICD-10-CM

## 2024-08-29 PROCEDURE — 76536 US EXAM OF HEAD AND NECK: CPT

## 2024-09-02 ENCOUNTER — TELEPHONE (OUTPATIENT)
Dept: ENDOCRINOLOGY | Age: 39
End: 2024-09-02

## 2024-09-02 NOTE — TELEPHONE ENCOUNTER
Notify patient    Thyroid ultrasound was negative for any significant thyroid nodules.  Will discuss this in details at your next office visit

## 2025-04-01 ENCOUNTER — APPOINTMENT (OUTPATIENT)
Dept: CT IMAGING | Age: 40
End: 2025-04-01
Payer: COMMERCIAL

## 2025-04-01 ENCOUNTER — APPOINTMENT (OUTPATIENT)
Dept: ULTRASOUND IMAGING | Age: 40
End: 2025-04-01
Payer: COMMERCIAL

## 2025-04-01 ENCOUNTER — HOSPITAL ENCOUNTER (EMERGENCY)
Age: 40
Discharge: HOME | End: 2025-04-01
Attending: EMERGENCY MEDICINE
Payer: COMMERCIAL

## 2025-04-01 ENCOUNTER — HOSPITAL ENCOUNTER (EMERGENCY)
Age: 40
Discharge: HOME | End: 2025-04-02
Payer: COMMERCIAL

## 2025-04-01 VITALS
HEART RATE: 71 BPM | BODY MASS INDEX: 29.84 KG/M2 | HEIGHT: 59 IN | OXYGEN SATURATION: 100 % | WEIGHT: 148 LBS | RESPIRATION RATE: 16 BRPM | DIASTOLIC BLOOD PRESSURE: 97 MMHG | TEMPERATURE: 98.6 F | SYSTOLIC BLOOD PRESSURE: 128 MMHG

## 2025-04-01 VITALS
BODY MASS INDEX: 29.84 KG/M2 | HEART RATE: 81 BPM | SYSTOLIC BLOOD PRESSURE: 135 MMHG | RESPIRATION RATE: 16 BRPM | OXYGEN SATURATION: 99 % | TEMPERATURE: 98 F | DIASTOLIC BLOOD PRESSURE: 90 MMHG | HEIGHT: 59 IN | WEIGHT: 148 LBS

## 2025-04-01 DIAGNOSIS — K52.9 COLITIS: Primary | ICD-10-CM

## 2025-04-01 DIAGNOSIS — R10.10 PAIN OF UPPER ABDOMEN: Primary | ICD-10-CM

## 2025-04-01 DIAGNOSIS — R07.9 CHEST PAIN, UNSPECIFIED TYPE: ICD-10-CM

## 2025-04-01 LAB
ALBUMIN SERPL-MCNC: 4.3 G/DL (ref 3.5–5.2)
ALBUMIN SERPL-MCNC: 4.4 G/DL (ref 3.5–5.2)
ALP SERPL-CCNC: 84 U/L (ref 35–104)
ALP SERPL-CCNC: 86 U/L (ref 35–104)
ALT SERPL-CCNC: 28 U/L (ref 0–32)
ALT SERPL-CCNC: 28 U/L (ref 0–32)
AMYLASE SERPL-CCNC: 88 U/L (ref 20–100)
ANION GAP SERPL CALCULATED.3IONS-SCNC: 11 MMOL/L (ref 7–16)
ANION GAP SERPL CALCULATED.3IONS-SCNC: 9 MMOL/L (ref 7–16)
AST SERPL-CCNC: 21 U/L (ref 0–31)
AST SERPL-CCNC: 22 U/L (ref 0–31)
BACTERIA URNS QL MICRO: ABNORMAL
BASOPHILS # BLD: 0.02 K/UL (ref 0–0.2)
BASOPHILS NFR BLD: 0 % (ref 0–2)
BILIRUB DIRECT SERPL-MCNC: <0.2 MG/DL (ref 0–0.3)
BILIRUB INDIRECT SERPL-MCNC: NORMAL MG/DL (ref 0–1)
BILIRUB SERPL-MCNC: 0.2 MG/DL (ref 0–1.2)
BILIRUB SERPL-MCNC: 0.2 MG/DL (ref 0–1.2)
BILIRUB UR QL STRIP: NEGATIVE
BILIRUB UR QL STRIP: NEGATIVE
BUN SERPL-MCNC: 15 MG/DL (ref 6–20)
BUN SERPL-MCNC: 17 MG/DL (ref 6–20)
CALCIUM SERPL-MCNC: 9.7 MG/DL (ref 8.6–10.2)
CALCIUM SERPL-MCNC: 9.8 MG/DL (ref 8.6–10.2)
CHLORIDE SERPL-SCNC: 104 MMOL/L (ref 98–107)
CHLORIDE SERPL-SCNC: 107 MMOL/L (ref 98–107)
CLARITY UR: CLEAR
CLARITY UR: CLEAR
CO2 SERPL-SCNC: 26 MMOL/L (ref 22–29)
CO2 SERPL-SCNC: 29 MMOL/L (ref 22–29)
COLOR UR: YELLOW
COLOR UR: YELLOW
CREAT SERPL-MCNC: 0.9 MG/DL (ref 0.5–1)
CREAT SERPL-MCNC: 0.9 MG/DL (ref 0.5–1)
D-DIMER QUANTITATIVE: 235 NG/ML DDU (ref 0–230)
EKG ATRIAL RATE: 76 BPM
EKG P AXIS: 48 DEGREES
EKG P-R INTERVAL: 152 MS
EKG Q-T INTERVAL: 396 MS
EKG QRS DURATION: 74 MS
EKG QTC CALCULATION (BAZETT): 445 MS
EKG R AXIS: 14 DEGREES
EKG T AXIS: 8 DEGREES
EKG VENTRICULAR RATE: 76 BPM
EOSINOPHIL # BLD: 0.07 K/UL (ref 0.05–0.5)
EOSINOPHILS RELATIVE PERCENT: 1 % (ref 0–6)
EPI CELLS #/AREA URNS HPF: ABNORMAL /HPF
EPI CELLS #/AREA URNS HPF: ABNORMAL /HPF
ERYTHROCYTE [DISTWIDTH] IN BLOOD BY AUTOMATED COUNT: 12.7 % (ref 11.5–15)
ERYTHROCYTE [DISTWIDTH] IN BLOOD BY AUTOMATED COUNT: 12.8 % (ref 11.5–15)
GFR, ESTIMATED: 78 ML/MIN/1.73M2
GFR, ESTIMATED: 87 ML/MIN/1.73M2
GLUCOSE SERPL-MCNC: 103 MG/DL (ref 74–99)
GLUCOSE SERPL-MCNC: 90 MG/DL (ref 74–99)
GLUCOSE UR STRIP-MCNC: NEGATIVE MG/DL
GLUCOSE UR STRIP-MCNC: NEGATIVE MG/DL
HCT VFR BLD AUTO: 37.9 % (ref 34–48)
HCT VFR BLD AUTO: 37.9 % (ref 34–48)
HGB BLD-MCNC: 12.7 G/DL (ref 11.5–15.5)
HGB BLD-MCNC: 12.9 G/DL (ref 11.5–15.5)
HGB UR QL STRIP.AUTO: ABNORMAL
HGB UR QL STRIP.AUTO: ABNORMAL
IMM GRANULOCYTES # BLD AUTO: <0.03 K/UL (ref 0–0.58)
IMM GRANULOCYTES NFR BLD: 0 % (ref 0–5)
KETONES UR STRIP-MCNC: NEGATIVE MG/DL
KETONES UR STRIP-MCNC: NEGATIVE MG/DL
LACTATE BLDV-SCNC: 1.1 MMOL/L (ref 0.5–2.2)
LEUKOCYTE ESTERASE UR QL STRIP: ABNORMAL
LEUKOCYTE ESTERASE UR QL STRIP: ABNORMAL
LIPASE SERPL-CCNC: 35 U/L (ref 13–60)
LYMPHOCYTES NFR BLD: 3.62 K/UL (ref 1.5–4)
LYMPHOCYTES RELATIVE PERCENT: 43 % (ref 20–42)
MAGNESIUM SERPL-MCNC: 2 MG/DL (ref 1.6–2.6)
MCH RBC QN AUTO: 30.9 PG (ref 26–35)
MCH RBC QN AUTO: 31.1 PG (ref 26–35)
MCHC RBC AUTO-ENTMCNC: 33.5 G/DL (ref 32–34.5)
MCHC RBC AUTO-ENTMCNC: 34 G/DL (ref 32–34.5)
MCV RBC AUTO: 90.7 FL (ref 80–99.9)
MCV RBC AUTO: 92.7 FL (ref 80–99.9)
MONOCYTES NFR BLD: 0.58 K/UL (ref 0.1–0.95)
MONOCYTES NFR BLD: 7 % (ref 2–12)
NEUTROPHILS NFR BLD: 49 % (ref 43–80)
NEUTS SEG NFR BLD: 4.11 K/UL (ref 1.8–7.3)
NITRITE UR QL STRIP: NEGATIVE
NITRITE UR QL STRIP: NEGATIVE
PH UR STRIP: 6 [PH] (ref 5–8)
PH UR STRIP: 6.5 [PH] (ref 5–8)
PLATELET # BLD AUTO: 254 K/UL (ref 130–450)
PLATELET # BLD AUTO: 258 K/UL (ref 130–450)
PMV BLD AUTO: 8.7 FL (ref 7–12)
PMV BLD AUTO: 8.9 FL (ref 7–12)
POTASSIUM SERPL-SCNC: 4.1 MMOL/L (ref 3.5–5)
POTASSIUM SERPL-SCNC: 4.2 MMOL/L (ref 3.5–5)
PROT SERPL-MCNC: 7.2 G/DL (ref 6.4–8.3)
PROT SERPL-MCNC: 7.2 G/DL (ref 6.4–8.3)
PROT UR STRIP-MCNC: NEGATIVE MG/DL
PROT UR STRIP-MCNC: NEGATIVE MG/DL
RBC # BLD AUTO: 4.09 M/UL (ref 3.5–5.5)
RBC # BLD AUTO: 4.18 M/UL (ref 3.5–5.5)
RBC #/AREA URNS HPF: ABNORMAL /HPF
RBC #/AREA URNS HPF: ABNORMAL /HPF
SODIUM SERPL-SCNC: 141 MMOL/L (ref 132–146)
SODIUM SERPL-SCNC: 145 MMOL/L (ref 132–146)
SP GR UR STRIP: 1.02 (ref 1–1.03)
SP GR UR STRIP: <1.005 (ref 1–1.03)
UROBILINOGEN UR STRIP-ACNC: 0.2 EU/DL (ref 0–1)
UROBILINOGEN UR STRIP-ACNC: 0.2 EU/DL (ref 0–1)
WBC #/AREA URNS HPF: ABNORMAL /HPF
WBC #/AREA URNS HPF: ABNORMAL /HPF
WBC OTHER # BLD: 8 K/UL (ref 4.5–11.5)
WBC OTHER # BLD: 8.4 K/UL (ref 4.5–11.5)

## 2025-04-01 PROCEDURE — 80053 COMPREHEN METABOLIC PANEL: CPT

## 2025-04-01 PROCEDURE — 96374 THER/PROPH/DIAG INJ IV PUSH: CPT

## 2025-04-01 PROCEDURE — 6370000000 HC RX 637 (ALT 250 FOR IP): Performed by: PHYSICIAN ASSISTANT

## 2025-04-01 PROCEDURE — 82248 BILIRUBIN DIRECT: CPT

## 2025-04-01 PROCEDURE — 99285 EMERGENCY DEPT VISIT HI MDM: CPT

## 2025-04-01 PROCEDURE — 93005 ELECTROCARDIOGRAM TRACING: CPT | Performed by: EMERGENCY MEDICINE

## 2025-04-01 PROCEDURE — 6360000004 HC RX CONTRAST MEDICATION: Performed by: RADIOLOGY

## 2025-04-01 PROCEDURE — 83735 ASSAY OF MAGNESIUM: CPT

## 2025-04-01 PROCEDURE — 2500000003 HC RX 250 WO HCPCS: Performed by: EMERGENCY MEDICINE

## 2025-04-01 PROCEDURE — 85025 COMPLETE CBC W/AUTO DIFF WBC: CPT

## 2025-04-01 PROCEDURE — 71275 CT ANGIOGRAPHY CHEST: CPT

## 2025-04-01 PROCEDURE — 6360000002 HC RX W HCPCS: Performed by: EMERGENCY MEDICINE

## 2025-04-01 PROCEDURE — 74176 CT ABD & PELVIS W/O CONTRAST: CPT

## 2025-04-01 PROCEDURE — 85379 FIBRIN DEGRADATION QUANT: CPT

## 2025-04-01 PROCEDURE — 83690 ASSAY OF LIPASE: CPT

## 2025-04-01 PROCEDURE — 74177 CT ABD & PELVIS W/CONTRAST: CPT

## 2025-04-01 PROCEDURE — 81001 URINALYSIS AUTO W/SCOPE: CPT

## 2025-04-01 PROCEDURE — 6360000002 HC RX W HCPCS: Performed by: PHYSICIAN ASSISTANT

## 2025-04-01 PROCEDURE — 96375 TX/PRO/DX INJ NEW DRUG ADDON: CPT

## 2025-04-01 PROCEDURE — 85027 COMPLETE CBC AUTOMATED: CPT

## 2025-04-01 PROCEDURE — 99284 EMERGENCY DEPT VISIT MOD MDM: CPT

## 2025-04-01 PROCEDURE — 76705 ECHO EXAM OF ABDOMEN: CPT

## 2025-04-01 PROCEDURE — 93010 ELECTROCARDIOGRAM REPORT: CPT | Performed by: INTERNAL MEDICINE

## 2025-04-01 PROCEDURE — 83605 ASSAY OF LACTIC ACID: CPT

## 2025-04-01 PROCEDURE — 2580000003 HC RX 258: Performed by: EMERGENCY MEDICINE

## 2025-04-01 PROCEDURE — 82150 ASSAY OF AMYLASE: CPT

## 2025-04-01 RX ORDER — 0.9 % SODIUM CHLORIDE 0.9 %
1000 INTRAVENOUS SOLUTION INTRAVENOUS ONCE
Status: COMPLETED | OUTPATIENT
Start: 2025-04-01 | End: 2025-04-01

## 2025-04-01 RX ORDER — HYDROCODONE BITARTRATE AND ACETAMINOPHEN 5; 325 MG/1; MG/1
1 TABLET ORAL ONCE
Status: COMPLETED | OUTPATIENT
Start: 2025-04-01 | End: 2025-04-01

## 2025-04-01 RX ORDER — TRAMADOL HYDROCHLORIDE 50 MG/1
50 TABLET ORAL EVERY 6 HOURS PRN
Qty: 12 TABLET | Refills: 0 | Status: SHIPPED | OUTPATIENT
Start: 2025-04-01 | End: 2025-04-04

## 2025-04-01 RX ORDER — ONDANSETRON 4 MG/1
4 TABLET, FILM COATED ORAL EVERY 8 HOURS PRN
Qty: 12 TABLET | Refills: 0 | Status: SHIPPED | OUTPATIENT
Start: 2025-04-01

## 2025-04-01 RX ORDER — MORPHINE SULFATE 2 MG/ML
2 INJECTION, SOLUTION INTRAMUSCULAR; INTRAVENOUS ONCE
Refills: 0 | Status: COMPLETED | OUTPATIENT
Start: 2025-04-01 | End: 2025-04-01

## 2025-04-01 RX ORDER — IOPAMIDOL 755 MG/ML
75 INJECTION, SOLUTION INTRAVASCULAR
Status: COMPLETED | OUTPATIENT
Start: 2025-04-01 | End: 2025-04-01

## 2025-04-01 RX ORDER — ONDANSETRON 2 MG/ML
4 INJECTION INTRAMUSCULAR; INTRAVENOUS ONCE
Status: COMPLETED | OUTPATIENT
Start: 2025-04-01 | End: 2025-04-01

## 2025-04-01 RX ORDER — METRONIDAZOLE 500 MG/1
500 TABLET ORAL ONCE
Status: COMPLETED | OUTPATIENT
Start: 2025-04-02 | End: 2025-04-02

## 2025-04-01 RX ORDER — FAMOTIDINE 20 MG/1
20 TABLET, FILM COATED ORAL 2 TIMES DAILY
Qty: 60 TABLET | Refills: 3 | Status: SHIPPED | OUTPATIENT
Start: 2025-04-01

## 2025-04-01 RX ORDER — KETOROLAC TROMETHAMINE 30 MG/ML
15 INJECTION, SOLUTION INTRAMUSCULAR; INTRAVENOUS ONCE
Status: COMPLETED | OUTPATIENT
Start: 2025-04-01 | End: 2025-04-01

## 2025-04-01 RX ADMIN — KETOROLAC TROMETHAMINE 15 MG: 30 INJECTION, SOLUTION INTRAMUSCULAR at 03:01

## 2025-04-01 RX ADMIN — HYDROCODONE BITARTRATE AND ACETAMINOPHEN 1 TABLET: 5; 325 TABLET ORAL at 20:23

## 2025-04-01 RX ADMIN — MORPHINE SULFATE 2 MG: 2 INJECTION, SOLUTION INTRAMUSCULAR; INTRAVENOUS at 03:02

## 2025-04-01 RX ADMIN — SODIUM CHLORIDE 1000 ML: 0.9 INJECTION, SOLUTION INTRAVENOUS at 02:42

## 2025-04-01 RX ADMIN — FAMOTIDINE 20 MG: 10 INJECTION, SOLUTION INTRAVENOUS at 02:41

## 2025-04-01 RX ADMIN — ONDANSETRON 4 MG: 2 INJECTION, SOLUTION INTRAMUSCULAR; INTRAVENOUS at 20:23

## 2025-04-01 RX ADMIN — ONDANSETRON 4 MG: 2 INJECTION, SOLUTION INTRAMUSCULAR; INTRAVENOUS at 02:40

## 2025-04-01 RX ADMIN — IOPAMIDOL 75 ML: 755 INJECTION, SOLUTION INTRAVENOUS at 21:18

## 2025-04-01 ASSESSMENT — PAIN DESCRIPTION - ONSET
ONSET: ON-GOING

## 2025-04-01 ASSESSMENT — PAIN DESCRIPTION - LOCATION
LOCATION: ABDOMEN;FLANK
LOCATION: ABDOMEN
LOCATION: FLANK
LOCATION: ABDOMEN

## 2025-04-01 ASSESSMENT — PAIN DESCRIPTION - DESCRIPTORS
DESCRIPTORS: DISCOMFORT
DESCRIPTORS: ACHING
DESCRIPTORS: CRAMPING;STABBING
DESCRIPTORS: DISCOMFORT

## 2025-04-01 ASSESSMENT — PAIN SCALES - GENERAL
PAINLEVEL_OUTOF10: 10
PAINLEVEL_OUTOF10: 10
PAINLEVEL_OUTOF10: 5
PAINLEVEL_OUTOF10: 8

## 2025-04-01 ASSESSMENT — PAIN - FUNCTIONAL ASSESSMENT
PAIN_FUNCTIONAL_ASSESSMENT: 0-10
PAIN_FUNCTIONAL_ASSESSMENT: 0-10
PAIN_FUNCTIONAL_ASSESSMENT: ACTIVITIES ARE NOT PREVENTED
PAIN_FUNCTIONAL_ASSESSMENT: INTOLERABLE, UNABLE TO DO ANY ACTIVE OR PASSIVE ACTIVITIES
PAIN_FUNCTIONAL_ASSESSMENT: 0-10

## 2025-04-01 ASSESSMENT — PAIN DESCRIPTION - FREQUENCY
FREQUENCY: INTERMITTENT
FREQUENCY: CONTINUOUS
FREQUENCY: INTERMITTENT
FREQUENCY: CONTINUOUS

## 2025-04-01 ASSESSMENT — PAIN DESCRIPTION - PAIN TYPE
TYPE: ACUTE PAIN

## 2025-04-01 ASSESSMENT — PAIN DESCRIPTION - ORIENTATION
ORIENTATION: RIGHT
ORIENTATION: RIGHT

## 2025-04-01 NOTE — ED PROVIDER NOTES
HPI:  4/1/25,   Time: 2:58 AM EDT         Flori Dyer is a 39 y.o. female presenting to the ED for epigastric pain, beginning with vomiting ago.  The complaint has been persistent, moderate in severity, and worsened by nothing.  She points more over her abdomen she denies any chest pain or shortness of breath she is not tachycardic she is not hypoxic we will get an EKG since she says its kind of over her low upper abdomen or lower sternum she has no risk factors for early heart disease or PE pulmonary emboli no leg swelling no calf pain she is not on birth control pills    ROS:   Pertinent positives and negatives are stated within HPI, all other systems reviewed and are negative.  --------------------------------------------- PAST HISTORY ---------------------------------------------  Past Medical History:  has a past medical history of Abnormal Pap smear, Back pain, Bronchitis, Chronic back pain, Endometriosis, H/O colposcopy with cervical biopsy, Headache, Hypertension, Placenta previa, Pregnancy induced hypertension, and Thyroid disease.    Past Surgical History:  has a past surgical history that includes Ovary removal; [place] tooth extraction; Tubal ligation (2013); Hysterectomy (2013); and hernia repair.    Social History:  reports that she quit smoking about 9 years ago. Her smoking use included cigarettes. She has never used smokeless tobacco. She reports current drug use. Drug: Marijuana (Weed). She reports that she does not drink alcohol.    Family History: family history includes High Blood Pressure in her mother.     The patient’s home medications have been reviewed.    Allergies: Aspirin and Nsaids    -------------------------------------------------- RESULTS -------------------------------------------------  All laboratory and radiology results have been personally reviewed by myself   LABS:  Results for orders placed or performed during the hospital encounter of 04/01/25   CBC   Result Value Ref

## 2025-04-01 NOTE — DISCHARGE INSTRUCTIONS
Return if chest pain shortness of breath increased abdominal pain fevers vomiting or any bleeding follow-up with GI doctor in rate cardiologist as soon as possible

## 2025-04-02 PROCEDURE — 6370000000 HC RX 637 (ALT 250 FOR IP): Performed by: PHYSICIAN ASSISTANT

## 2025-04-02 PROCEDURE — 2500000003 HC RX 250 WO HCPCS: Performed by: PHYSICIAN ASSISTANT

## 2025-04-02 PROCEDURE — 96375 TX/PRO/DX INJ NEW DRUG ADDON: CPT

## 2025-04-02 PROCEDURE — 6360000002 HC RX W HCPCS: Performed by: PHYSICIAN ASSISTANT

## 2025-04-02 RX ORDER — CEFDINIR 300 MG/1
300 CAPSULE ORAL 2 TIMES DAILY
Qty: 14 CAPSULE | Refills: 0 | Status: SHIPPED | OUTPATIENT
Start: 2025-04-02 | End: 2025-04-09

## 2025-04-02 RX ORDER — METRONIDAZOLE 500 MG/1
500 TABLET ORAL 3 TIMES DAILY
Qty: 21 TABLET | Refills: 0 | Status: SHIPPED | OUTPATIENT
Start: 2025-04-02 | End: 2025-04-09

## 2025-04-02 RX ADMIN — WATER 1000 MG: 1 INJECTION INTRAMUSCULAR; INTRAVENOUS; SUBCUTANEOUS at 00:04

## 2025-04-02 RX ADMIN — METRONIDAZOLE 500 MG: 500 TABLET ORAL at 00:04

## 2025-04-02 NOTE — DISCHARGE INSTR - COC
of lumbar intervertebral disc M51.26    Radiculopathy due to lumbar intervertebral disc disorder M51.16    Peripheral neuropathy due to ischemia G58.8    Osteoarthritis M19.90    Lumbar strain S39.012A    Intervertebral disc disorders with radiculopathy, lumbar region M51.16    Insomnia due to medical condition G47.01    Neck pain M54.2    Torticollis M43.6    Cervical strain S16.1XXA    Spasm of muscle M62.838    Chest pain R07.9    Pain of upper abdomen R10.10       Isolation/Infection:   Isolation            No Isolation          Patient Infection Status    None to display              Nurse Assessment:  Last Vital Signs: BP (!) 128/97   Pulse 71   Temp 98.6 °F (37 °C) (Oral)   Resp 16   Ht 1.499 m (4' 11\")   Wt 67.1 kg (148 lb)   LMP 05/11/2013   SpO2 100%   BMI 29.89 kg/m²     Last documented pain score (0-10 scale): Pain Level: 8  Last Weight:   Wt Readings from Last 1 Encounters:   04/01/25 67.1 kg (148 lb)     Mental Status:  {IP PT MENTAL STATUS:20030}    IV Access:  { MARIAM IV ACCESS:958001580}    Nursing Mobility/ADLs:  Walking   {CHP DME ADLs:533777952}  Transfer  {CHP DME ADLs:198230644}  Bathing  {CHP DME ADLs:512176406}  Dressing  {CHP DME ADLs:489572515}  Toileting  {CHP DME ADLs:687519751}  Feeding  {P DME ADLs:035423806}  Med Admin  {P DME ADLs:692364506}  Med Delivery   { MARIAM MED Delivery:406548942}    Wound Care Documentation and Therapy:        Elimination:  Continence:   Bowel: {YES / NO:19727}  Bladder: {YES / NO:19727}  Urinary Catheter: {Urinary Catheter:383544726}   Colostomy/Ileostomy/Ileal Conduit: {YES / NO:19727}       Date of Last BM: ***  No intake or output data in the 24 hours ending 04/02/25 0036  No intake/output data recorded.    Safety Concerns:     { MARIAM Safety Concerns:209071591}    Impairments/Disabilities:      { MARIAM Impairments/Disabilities:658731783}    Nutrition Therapy:  Current Nutrition Therapy:   {MH MARIAM Diet List:196339408}    Routes of Feeding:

## 2025-04-02 NOTE — ED PROVIDER NOTES
Independent [facility] Visit.      [facility]  Department of Emergency Medicine   ED  Encounter Note  Admit Date/RoomTime: 2025  7:29 PM  ED Room: WAITING RESULTS/WAITING *    NAME: Flori Dyer  : 1985  MRN: [id number]     Chief Complaint:  Abdominal Pain (Right side, nausea. Seen in [facility] this AM, states pain is worse now and meds are not helping, did not get any answers. ) and Flank Pain (Right flank)    History of Present Illness       Flori Dyer is a 39 y.o. old female who presents to the emergency department by private vehicle, for gradual onset, persistent, worsening colicky, cramping pain in the RUQ and in the right flank without radiation which began 1 week(s) prior to arrival.   There has been no similar episodes in the past.  Since onset the symptoms have been gradually worsening.  The pain is associated with nausea, no vomiting.  The pain is aggravated by lying down and relieved by sitting up.  There has been NO chest pain, shortness of breath, fever, diarrhea, constipation, or dysuria. Last Menstrual  Cycle or OB history not available or N/A.  hysterectomy. Pt was at [facility] today and had normal labs and normal CTAP without contrast.  She received morphine, toradol and fluids and had relief of pain,. She filled her prescriptions but has not taken them but the pain is back and even worse than before. Pt provided history.   ROS   Pertinent positives and negatives are stated within HPI, all other systems reviewed and are negative.    Past Medical History:  has a past medical history of Abnormal Pap smear, Back pain, Bronchitis, Chronic back pain, Endometriosis, H/O colposcopy with cervical biopsy, Headache, Hypertension, Placenta previa, Pregnancy induced hypertension, and Thyroid disease.    Surgical History has a past surgical history that includes Ovary removal; [place] tooth extraction; Tubal ligation (); Hysterectomy (); and hernia repair.    Social

## 2025-04-15 ENCOUNTER — HOSPITAL ENCOUNTER (EMERGENCY)
Age: 40
Discharge: HOME | End: 2025-04-15
Attending: STUDENT IN AN ORGANIZED HEALTH CARE EDUCATION/TRAINING PROGRAM
Payer: COMMERCIAL

## 2025-04-15 ENCOUNTER — APPOINTMENT (OUTPATIENT)
Dept: GENERAL RADIOLOGY | Age: 40
End: 2025-04-15
Payer: COMMERCIAL

## 2025-04-15 VITALS
SYSTOLIC BLOOD PRESSURE: 138 MMHG | RESPIRATION RATE: 16 BRPM | OXYGEN SATURATION: 98 % | HEART RATE: 75 BPM | TEMPERATURE: 97.8 F | DIASTOLIC BLOOD PRESSURE: 89 MMHG

## 2025-04-15 DIAGNOSIS — S92.501A CLOSED FRACTURE OF PHALANX OF RIGHT FIFTH TOE, INITIAL ENCOUNTER: Primary | ICD-10-CM

## 2025-04-15 PROCEDURE — 6370000000 HC RX 637 (ALT 250 FOR IP): Performed by: STUDENT IN AN ORGANIZED HEALTH CARE EDUCATION/TRAINING PROGRAM

## 2025-04-15 PROCEDURE — 73610 X-RAY EXAM OF ANKLE: CPT

## 2025-04-15 PROCEDURE — 99283 EMERGENCY DEPT VISIT LOW MDM: CPT

## 2025-04-15 PROCEDURE — 73630 X-RAY EXAM OF FOOT: CPT

## 2025-04-15 RX ORDER — HYDROCODONE BITARTRATE AND ACETAMINOPHEN 5; 325 MG/1; MG/1
1 TABLET ORAL ONCE
Status: COMPLETED | OUTPATIENT
Start: 2025-04-15 | End: 2025-04-15

## 2025-04-15 RX ORDER — LISINOPRIL 2.5 MG/1
2.5 TABLET ORAL DAILY
COMMUNITY

## 2025-04-15 RX ADMIN — HYDROCODONE BITARTRATE AND ACETAMINOPHEN 1 TABLET: 5; 325 TABLET ORAL at 01:23

## 2025-04-15 ASSESSMENT — PAIN DESCRIPTION - LOCATION
LOCATION: FOOT;ANKLE
LOCATION: FOOT;ANKLE

## 2025-04-15 ASSESSMENT — PAIN DESCRIPTION - PAIN TYPE: TYPE: ACUTE PAIN

## 2025-04-15 ASSESSMENT — PAIN SCALES - GENERAL
PAINLEVEL_OUTOF10: 6
PAINLEVEL_OUTOF10: 10

## 2025-04-15 ASSESSMENT — PAIN DESCRIPTION - DESCRIPTORS
DESCRIPTORS: DISCOMFORT
DESCRIPTORS: DISCOMFORT

## 2025-04-15 ASSESSMENT — ENCOUNTER SYMPTOMS
SHORTNESS OF BREATH: 0
VOMITING: 0
DIARRHEA: 0
COUGH: 0
NAUSEA: 0
COLOR CHANGE: 0
ABDOMINAL PAIN: 0
BACK PAIN: 0

## 2025-04-15 ASSESSMENT — PAIN DESCRIPTION - ORIENTATION
ORIENTATION: RIGHT
ORIENTATION: RIGHT

## 2025-04-15 ASSESSMENT — PAIN DESCRIPTION - FREQUENCY: FREQUENCY: INTERMITTENT

## 2025-04-15 ASSESSMENT — PAIN DESCRIPTION - ONSET: ONSET: ON-GOING

## 2025-04-15 ASSESSMENT — PAIN - FUNCTIONAL ASSESSMENT
PAIN_FUNCTIONAL_ASSESSMENT: PREVENTS OR INTERFERES WITH ALL ACTIVE AND SOME PASSIVE ACTIVITIES
PAIN_FUNCTIONAL_ASSESSMENT: INTOLERABLE, UNABLE TO DO ANY ACTIVE OR PASSIVE ACTIVITIES

## 2025-05-27 ENCOUNTER — HOSPITAL ENCOUNTER (OUTPATIENT)
Dept: GENERAL RADIOLOGY | Age: 40
Discharge: HOME | End: 2025-05-29
Payer: COMMERCIAL

## 2025-05-27 ENCOUNTER — HOSPITAL ENCOUNTER (OUTPATIENT)
Age: 40
Discharge: HOME | End: 2025-05-29
Payer: COMMERCIAL

## 2025-05-27 DIAGNOSIS — M79.671 RIGHT FOOT PAIN: ICD-10-CM

## 2025-05-27 PROCEDURE — 73630 X-RAY EXAM OF FOOT: CPT

## 2025-06-26 ENCOUNTER — APPOINTMENT (OUTPATIENT)
Dept: GENERAL RADIOLOGY | Age: 40
End: 2025-06-26
Payer: COMMERCIAL

## 2025-06-26 ENCOUNTER — HOSPITAL ENCOUNTER (EMERGENCY)
Age: 40
Discharge: HOME | End: 2025-06-26
Payer: COMMERCIAL

## 2025-06-26 VITALS
RESPIRATION RATE: 16 BRPM | SYSTOLIC BLOOD PRESSURE: 144 MMHG | BODY MASS INDEX: 31.1 KG/M2 | TEMPERATURE: 97.9 F | OXYGEN SATURATION: 98 % | DIASTOLIC BLOOD PRESSURE: 100 MMHG | WEIGHT: 154 LBS | HEART RATE: 83 BPM

## 2025-06-26 DIAGNOSIS — M79.644 PAIN OF RIGHT THUMB: Primary | ICD-10-CM

## 2025-06-26 PROCEDURE — 73140 X-RAY EXAM OF FINGER(S): CPT

## 2025-06-26 PROCEDURE — 99283 EMERGENCY DEPT VISIT LOW MDM: CPT

## 2025-06-26 ASSESSMENT — PAIN SCALES - GENERAL: PAINLEVEL_OUTOF10: 7

## 2025-06-26 ASSESSMENT — PAIN DESCRIPTION - ORIENTATION: ORIENTATION: RIGHT

## 2025-06-26 ASSESSMENT — PAIN DESCRIPTION - LOCATION: LOCATION: FINGER (COMMENT WHICH ONE)

## 2025-06-26 ASSESSMENT — PAIN DESCRIPTION - PAIN TYPE: TYPE: ACUTE PAIN

## 2025-06-26 ASSESSMENT — PAIN DESCRIPTION - FREQUENCY: FREQUENCY: CONTINUOUS

## 2025-06-26 ASSESSMENT — PAIN - FUNCTIONAL ASSESSMENT
PAIN_FUNCTIONAL_ASSESSMENT: 0-10
PAIN_FUNCTIONAL_ASSESSMENT: PREVENTS OR INTERFERES SOME ACTIVE ACTIVITIES AND ADLS

## 2025-06-26 ASSESSMENT — PAIN DESCRIPTION - DESCRIPTORS: DESCRIPTORS: DISCOMFORT;THROBBING

## 2025-06-26 ASSESSMENT — PAIN DESCRIPTION - ONSET: ONSET: ON-GOING

## 2025-06-26 NOTE — DISCHARGE INSTRUCTIONS
Thank you for attending [facility] Emergency Department.  There are a few things you need to be reminded about upon discharge:    As we discussed, your x-ray was negative for fractures or dislocations. You likely have a contusion or strain of your thumb.  Please follow-up with your PCP as needed.I would recommend tylenol and Ice.  If for what ever reason you develop worsening symptoms, such as worsening pain, please do not hesitate to return to the emergency department immediately for further evaluation.

## 2025-06-26 NOTE — ED PROVIDER NOTES
Independent [facility] Visit.         [facility] EMERGENCY DEPARTMENT  ED  Encounter Note  Admit Date/RoomTime: 2025  8:30 AM  ED Room: 15/15  NAME: Flori Dyer  : 1985  MRN: [id number]  PCP: Alexis Bazzi MD    CHIEF COMPLAINT     Hand Injury (Shut right thumb in car door on Saturday. )    HISTORY OF PRESENT ILLNESS        Flori Dyer is a 40 y.o. female, with a past medical hx of  has a past medical history of Abnormal Pap smear, Back pain, Bronchitis, Chronic back pain, Endometriosis, H/O colposcopy with cervical biopsy, Headache, Hypertension, Placenta previa, Pregnancy induced hypertension, and Thyroid disease. who presents to the ED by private vehicle for evaluation of right thumb pain and injury, beginning 5 days ago. The complaint has been persistent and are mild in severity.  Patient reports that this past Saturday she was running errands for her graduation party when she accidentally closed her right thumb in the car door. Since then she has had persistent dull pain to her right thumb, worse with movement. She denies having any numbness or weakness or having any other injuries.     REVIEW OF SYSTEMS     Pertinent positives and negatives are stated within HPI, all other systems reviewed and are negative.    Past Medical History:  has a past medical history of Abnormal Pap smear, Back pain, Bronchitis, Chronic back pain, Endometriosis, H/O colposcopy with cervical biopsy, Headache, Hypertension, Placenta previa, Pregnancy induced hypertension, and Thyroid disease.  Surgical History:  has a past surgical history that includes Ovary removal; [place] tooth extraction; Tubal ligation (); Hysterectomy (); and hernia repair.  Social History:  reports that she quit smoking about 9 years ago. Her smoking use included cigarettes. She has never used smokeless tobacco. She reports current drug use. Drug: Marijuana (Weed). She reports that she does not drink alcohol.  Family

## 2025-06-29 ENCOUNTER — HOSPITAL ENCOUNTER (EMERGENCY)
Age: 40
Discharge: HOME | End: 2025-06-29
Attending: EMERGENCY MEDICINE
Payer: COMMERCIAL

## 2025-06-29 ENCOUNTER — APPOINTMENT (OUTPATIENT)
Dept: GENERAL RADIOLOGY | Age: 40
End: 2025-06-29
Payer: COMMERCIAL

## 2025-06-29 VITALS
HEART RATE: 89 BPM | DIASTOLIC BLOOD PRESSURE: 87 MMHG | HEIGHT: 59 IN | OXYGEN SATURATION: 100 % | TEMPERATURE: 98 F | BODY MASS INDEX: 31.04 KG/M2 | RESPIRATION RATE: 17 BRPM | WEIGHT: 154 LBS | SYSTOLIC BLOOD PRESSURE: 145 MMHG

## 2025-06-29 DIAGNOSIS — S62.514A NONDISPLACED FRACTURE OF PROXIMAL PHALANX OF RIGHT THUMB, INITIAL ENCOUNTER FOR CLOSED FRACTURE: Primary | ICD-10-CM

## 2025-06-29 PROCEDURE — 99284 EMERGENCY DEPT VISIT MOD MDM: CPT

## 2025-06-29 PROCEDURE — 96372 THER/PROPH/DIAG INJ SC/IM: CPT

## 2025-06-29 PROCEDURE — 6360000002 HC RX W HCPCS: Performed by: EMERGENCY MEDICINE

## 2025-06-29 PROCEDURE — 29125 APPL SHORT ARM SPLINT STATIC: CPT

## 2025-06-29 PROCEDURE — 73130 X-RAY EXAM OF HAND: CPT

## 2025-06-29 RX ORDER — KETOROLAC TROMETHAMINE 30 MG/ML
30 INJECTION, SOLUTION INTRAMUSCULAR; INTRAVENOUS ONCE
Status: COMPLETED | OUTPATIENT
Start: 2025-06-29 | End: 2025-06-29

## 2025-06-29 RX ADMIN — KETOROLAC TROMETHAMINE 30 MG: 30 INJECTION, SOLUTION INTRAMUSCULAR at 03:13

## 2025-06-29 ASSESSMENT — PAIN DESCRIPTION - DESCRIPTORS: DESCRIPTORS: ACHING

## 2025-06-29 ASSESSMENT — PAIN - FUNCTIONAL ASSESSMENT: PAIN_FUNCTIONAL_ASSESSMENT: 0-10

## 2025-06-29 ASSESSMENT — PAIN DESCRIPTION - LOCATION: LOCATION: HAND;WRIST

## 2025-06-29 ASSESSMENT — PAIN SCALES - GENERAL
PAINLEVEL_OUTOF10: 10
PAINLEVEL_OUTOF10: 10

## 2025-06-29 ASSESSMENT — PAIN DESCRIPTION - ORIENTATION: ORIENTATION: RIGHT

## 2025-06-29 NOTE — ED PROVIDER NOTES
[facility] EMERGENCY DEPARTMENT  EMERGENCY DEPARTMENT ENCOUNTER        Pt Name: Flori Dyer  MRN: [id number]  Birthdate 1985  Date of evaluation: 6/29/2025  Provider: Shira Patel DO  PCP: Alexis Bazzi MD  Note Started: 2:53 AM EDT 6/29/25    CHIEF COMPLAINT       Chief Complaint   Patient presents with    Hand Injury     RIGHT HAND,  SHUT HER FINGER IN CAR DOOR LAST WEEK, AND SWELLING AND PAIN IS GETTING WORSE.  SEEN IN [facility], TOLD IT WAS NOT BROKEN       HISTORY OF PRESENT ILLNESS: 1 or more Elements   History From: Patient    Limitations to history : None    Flori Dyer is a 40 y.o. female who presents with concern for right thumb pain.  Notes that she cut her thumb in the corner last week, she is not having persistent pain and swelling is having difficulty moving it secondary to that and is concerned that there is still something wrong with it.  She did have an x-ray done at that time that did not demonstrate acute abnormalities.  No other associations.      EXTERNAL NOTE REVIEW:      On chart review last with PCP for asthma on 4/29/2025    REVIEW OF SYSTEMS :      Positives and Pertinent negatives as per HPI.     SURGICAL HISTORY     Past Surgical History:   Procedure Laterality Date    HERNIA REPAIR      HYSTERECTOMY (CERVIX STATUS UNKNOWN)  2013    OVARY REMOVAL      TUBAL LIGATION  2013    [place] TOOTH EXTRACTION         CURRENTMEDICATIONS       Discharge Medication List as of 6/29/2025  3:47 AM        CONTINUE these medications which have NOT CHANGED    Details   famotidine (PEPCID) 20 MG tablet Take 1 tablet by mouth 2 times daily, Disp-60 tablet, R-3Normal      lisinopril-hydroCHLOROthiazide (PRINZIDE;ZESTORETIC) 20-25 MG per tablet Take 1 tablet by mouth dailyHistorical Med      budesonide-formoterol (SYMBICORT) 80-4.5 MCG/ACT AERO Inhale 2 puffs into the lungs in the morning and 2 puffs in the evening.Historical Med      albuterol sulfate HFA

## 2025-07-01 ENCOUNTER — HOSPITAL ENCOUNTER (EMERGENCY)
Age: 40
Discharge: HOME | End: 2025-07-01
Payer: COMMERCIAL

## 2025-07-01 VITALS
DIASTOLIC BLOOD PRESSURE: 98 MMHG | OXYGEN SATURATION: 98 % | HEART RATE: 98 BPM | HEIGHT: 59 IN | WEIGHT: 150 LBS | BODY MASS INDEX: 30.24 KG/M2 | SYSTOLIC BLOOD PRESSURE: 137 MMHG | RESPIRATION RATE: 16 BRPM | TEMPERATURE: 98.6 F

## 2025-07-01 DIAGNOSIS — S62.514D CLOSED NONDISPLACED FRACTURE OF PROXIMAL PHALANX OF RIGHT THUMB WITH ROUTINE HEALING, SUBSEQUENT ENCOUNTER: Primary | ICD-10-CM

## 2025-07-01 PROCEDURE — 6370000000 HC RX 637 (ALT 250 FOR IP): Performed by: PHYSICIAN ASSISTANT

## 2025-07-01 PROCEDURE — 29125 APPL SHORT ARM SPLINT STATIC: CPT

## 2025-07-01 PROCEDURE — 99283 EMERGENCY DEPT VISIT LOW MDM: CPT

## 2025-07-01 RX ORDER — OXYCODONE AND ACETAMINOPHEN 5; 325 MG/1; MG/1
1 TABLET ORAL ONCE
Refills: 0 | Status: COMPLETED | OUTPATIENT
Start: 2025-07-01 | End: 2025-07-01

## 2025-07-01 RX ORDER — CEPHALEXIN 500 MG/1
500 CAPSULE ORAL 4 TIMES DAILY
Qty: 40 CAPSULE | Refills: 0 | Status: SHIPPED | OUTPATIENT
Start: 2025-07-01 | End: 2025-07-11

## 2025-07-01 RX ORDER — OXYCODONE AND ACETAMINOPHEN 5; 325 MG/1; MG/1
1 TABLET ORAL EVERY 6 HOURS PRN
Qty: 12 TABLET | Refills: 0 | Status: SHIPPED | OUTPATIENT
Start: 2025-07-01 | End: 2025-07-04

## 2025-07-01 RX ADMIN — OXYCODONE AND ACETAMINOPHEN 1 TABLET: 5; 325 TABLET ORAL at 13:02

## 2025-07-01 ASSESSMENT — PAIN - FUNCTIONAL ASSESSMENT
PAIN_FUNCTIONAL_ASSESSMENT: PREVENTS OR INTERFERES SOME ACTIVE ACTIVITIES AND ADLS
PAIN_FUNCTIONAL_ASSESSMENT: 0-10

## 2025-07-01 ASSESSMENT — PAIN DESCRIPTION - DESCRIPTORS: DESCRIPTORS: SHARP;SHOOTING;STABBING

## 2025-07-01 ASSESSMENT — PAIN SCALES - GENERAL: PAINLEVEL_OUTOF10: 10

## 2025-07-01 ASSESSMENT — PAIN DESCRIPTION - ORIENTATION: ORIENTATION: RIGHT

## 2025-07-01 ASSESSMENT — PAIN DESCRIPTION - LOCATION: LOCATION: WRIST

## 2025-07-01 ASSESSMENT — PAIN DESCRIPTION - PAIN TYPE: TYPE: ACUTE PAIN

## 2025-07-01 NOTE — ED PROVIDER NOTES
of Abnormal Pap smear, Back pain, Bronchitis, Chronic back pain, Endometriosis, H/O colposcopy with cervical biopsy, Headache, Hypertension, Placenta previa, Pregnancy induced hypertension, and Thyroid disease.    Surgical History:  has a past surgical history that includes Ovary removal; [place] tooth extraction; Tubal ligation (2013); Hysterectomy (2013); and hernia repair.    Social History:  reports that she quit smoking about 9 years ago. Her smoking use included cigarettes. She has never used smokeless tobacco. She reports current drug use. Drug: Marijuana (Weed). She reports that she does not drink alcohol.    Family History: family history includes High Blood Pressure in her mother.     Allergies: Aspirin and Nsaids    Physical Exam   Oxygen Saturation Interpretation: Normal.        ED Triage Vitals [07/01/25 1227]   BP Systolic BP Percentile Diastolic BP Percentile Temp Temp Source Pulse Respirations SpO2   (!) 154/108 -- -- 98.6 °F (37 °C) Oral 100 16 98 %      Height Weight - Scale         1.499 m (4' 11\") 68 kg (150 lb)               Constitutional:  Alert, development consistent with age.  Neck:  Normal ROM.  Supple. Non-tender.  Fingers:  Right Thumb diffuse            Tenderness:  severe.            Swelling: Moderate.            Deformity: no deformity observed/palpated.              ROM: diminished range with pain.            Skin:  no wounds or erythema.  There does appear to be a subungual hematoma although this is very dark and therefore blood is most likely coagulated.  There is no cuticle line paronychia or signs of felon or abscess on evaluation.  No erythema or streaking consistent with definitive cellulitis at this time.  There is no crepitance or concern for free air.  Nail is intact without lifting.       Neurovascular:              Motor deficit: none.              Sensory deficit:   none.              Pulse deficit: none.              Capillary refill: normal.  Right Hand: first/Thumb

## 2025-07-02 ENCOUNTER — APPOINTMENT (OUTPATIENT)
Dept: GENERAL RADIOLOGY | Age: 40
End: 2025-07-02
Payer: COMMERCIAL

## 2025-07-02 ENCOUNTER — HOSPITAL ENCOUNTER (EMERGENCY)
Age: 40
Discharge: HOME | End: 2025-07-02
Attending: EMERGENCY MEDICINE
Payer: COMMERCIAL

## 2025-07-02 VITALS
RESPIRATION RATE: 16 BRPM | BODY MASS INDEX: 30.24 KG/M2 | SYSTOLIC BLOOD PRESSURE: 121 MMHG | TEMPERATURE: 98 F | HEART RATE: 94 BPM | HEIGHT: 59 IN | DIASTOLIC BLOOD PRESSURE: 74 MMHG | WEIGHT: 150 LBS | OXYGEN SATURATION: 99 %

## 2025-07-02 DIAGNOSIS — L03.011 PARONYCHIA OF FINGER OF RIGHT HAND: Primary | ICD-10-CM

## 2025-07-02 LAB
ANION GAP SERPL CALCULATED.3IONS-SCNC: 15 MMOL/L (ref 7–16)
BASOPHILS # BLD: 0.03 K/UL (ref 0–0.2)
BASOPHILS NFR BLD: 0 % (ref 0–2)
BUN SERPL-MCNC: 13 MG/DL (ref 6–20)
CALCIUM SERPL-MCNC: 10.1 MG/DL (ref 8.6–10)
CHLORIDE SERPL-SCNC: 94 MMOL/L (ref 98–107)
CO2 SERPL-SCNC: 28 MMOL/L (ref 22–29)
CREAT SERPL-MCNC: 0.8 MG/DL (ref 0.5–1)
CRP SERPL HS-MCNC: 93.9 MG/L (ref 0–5)
EOSINOPHIL # BLD: 0.03 K/UL (ref 0.05–0.5)
EOSINOPHILS RELATIVE PERCENT: 0 % (ref 0–6)
ERYTHROCYTE [DISTWIDTH] IN BLOOD BY AUTOMATED COUNT: 12.7 % (ref 11.5–15)
ERYTHROCYTE [SEDIMENTATION RATE] IN BLOOD BY WESTERGREN METHOD: 65 MM/HR (ref 0–20)
GFR, ESTIMATED: >90 ML/MIN/1.73M2
GLUCOSE SERPL-MCNC: 102 MG/DL (ref 74–99)
HCT VFR BLD AUTO: 44 % (ref 34–48)
HGB BLD-MCNC: 14.6 G/DL (ref 11.5–15.5)
IMM GRANULOCYTES # BLD AUTO: 0.03 K/UL (ref 0–0.58)
IMM GRANULOCYTES NFR BLD: 0 % (ref 0–5)
LYMPHOCYTES NFR BLD: 3.38 K/UL (ref 1.5–4)
LYMPHOCYTES RELATIVE PERCENT: 30 % (ref 20–42)
MAGNESIUM SERPL-MCNC: 2.1 MG/DL (ref 1.6–2.6)
MCH RBC QN AUTO: 30.7 PG (ref 26–35)
MCHC RBC AUTO-ENTMCNC: 33.2 G/DL (ref 32–34.5)
MCV RBC AUTO: 92.4 FL (ref 80–99.9)
MONOCYTES NFR BLD: 0.89 K/UL (ref 0.1–0.95)
MONOCYTES NFR BLD: 8 % (ref 2–12)
NEUTROPHILS NFR BLD: 61 % (ref 43–80)
NEUTS SEG NFR BLD: 6.84 K/UL (ref 1.8–7.3)
PLATELET # BLD AUTO: 271 K/UL (ref 130–450)
PMV BLD AUTO: 9.7 FL (ref 7–12)
POTASSIUM SERPL-SCNC: 3.6 MMOL/L (ref 3.5–5.1)
RBC # BLD AUTO: 4.76 M/UL (ref 3.5–5.5)
SODIUM SERPL-SCNC: 137 MMOL/L (ref 136–145)
WBC OTHER # BLD: 11.2 K/UL (ref 4.5–11.5)

## 2025-07-02 PROCEDURE — 80048 BASIC METABOLIC PNL TOTAL CA: CPT

## 2025-07-02 PROCEDURE — 96375 TX/PRO/DX INJ NEW DRUG ADDON: CPT

## 2025-07-02 PROCEDURE — 6370000000 HC RX 637 (ALT 250 FOR IP): Performed by: EMERGENCY MEDICINE

## 2025-07-02 PROCEDURE — 86140 C-REACTIVE PROTEIN: CPT

## 2025-07-02 PROCEDURE — 6360000002 HC RX W HCPCS: Performed by: EMERGENCY MEDICINE

## 2025-07-02 PROCEDURE — 87040 BLOOD CULTURE FOR BACTERIA: CPT

## 2025-07-02 PROCEDURE — 6360000002 HC RX W HCPCS

## 2025-07-02 PROCEDURE — 99284 EMERGENCY DEPT VISIT MOD MDM: CPT

## 2025-07-02 PROCEDURE — 10060 I&D ABSCESS SIMPLE/SINGLE: CPT

## 2025-07-02 PROCEDURE — 96374 THER/PROPH/DIAG INJ IV PUSH: CPT

## 2025-07-02 PROCEDURE — 73130 X-RAY EXAM OF HAND: CPT

## 2025-07-02 PROCEDURE — 85025 COMPLETE CBC W/AUTO DIFF WBC: CPT

## 2025-07-02 PROCEDURE — 83735 ASSAY OF MAGNESIUM: CPT

## 2025-07-02 PROCEDURE — 85652 RBC SED RATE AUTOMATED: CPT

## 2025-07-02 RX ORDER — LIDOCAINE HYDROCHLORIDE 10 MG/ML
5 INJECTION, SOLUTION EPIDURAL; INFILTRATION; INTRACAUDAL; PERINEURAL ONCE
Status: COMPLETED | OUTPATIENT
Start: 2025-07-02 | End: 2025-07-02

## 2025-07-02 RX ORDER — FENTANYL CITRATE 50 UG/ML
25 INJECTION, SOLUTION INTRAMUSCULAR; INTRAVENOUS ONCE
Refills: 0 | Status: COMPLETED | OUTPATIENT
Start: 2025-07-02 | End: 2025-07-02

## 2025-07-02 RX ORDER — SULFAMETHOXAZOLE AND TRIMETHOPRIM 800; 160 MG/1; MG/1
1 TABLET ORAL 2 TIMES DAILY
Qty: 20 TABLET | Refills: 0 | Status: SHIPPED | OUTPATIENT
Start: 2025-07-02 | End: 2025-07-12

## 2025-07-02 RX ORDER — ONDANSETRON 2 MG/ML
4 INJECTION INTRAMUSCULAR; INTRAVENOUS ONCE
Status: COMPLETED | OUTPATIENT
Start: 2025-07-02 | End: 2025-07-02

## 2025-07-02 RX ORDER — OXYCODONE AND ACETAMINOPHEN 5; 325 MG/1; MG/1
2 TABLET ORAL ONCE
Refills: 0 | Status: COMPLETED | OUTPATIENT
Start: 2025-07-02 | End: 2025-07-02

## 2025-07-02 RX ADMIN — OXYCODONE AND ACETAMINOPHEN 2 TABLET: 5; 325 TABLET ORAL at 18:48

## 2025-07-02 RX ADMIN — FENTANYL CITRATE 25 MCG: 50 INJECTION INTRAMUSCULAR; INTRAVENOUS at 21:58

## 2025-07-02 RX ADMIN — ONDANSETRON 4 MG: 2 INJECTION, SOLUTION INTRAMUSCULAR; INTRAVENOUS at 19:06

## 2025-07-02 RX ADMIN — LIDOCAINE HYDROCHLORIDE 5 ML: 10 INJECTION, SOLUTION EPIDURAL; INFILTRATION; INTRACAUDAL; PERINEURAL at 22:01

## 2025-07-02 ASSESSMENT — PAIN DESCRIPTION - LOCATION
LOCATION: HAND
LOCATION: WRIST

## 2025-07-02 ASSESSMENT — PAIN SCALES - GENERAL
PAINLEVEL_OUTOF10: 10
PAINLEVEL_OUTOF10: 10

## 2025-07-02 ASSESSMENT — PAIN - FUNCTIONAL ASSESSMENT
PAIN_FUNCTIONAL_ASSESSMENT: 0-10
PAIN_FUNCTIONAL_ASSESSMENT: NONE - DENIES PAIN
PAIN_FUNCTIONAL_ASSESSMENT: NONE - DENIES PAIN

## 2025-07-02 ASSESSMENT — PAIN DESCRIPTION - ORIENTATION
ORIENTATION: RIGHT
ORIENTATION: RIGHT

## 2025-07-08 NOTE — ED PROVIDER NOTES
DISPOSITION  Disposition: Discharge to home  Patient condition is stable      NOTE: This report was transcribed using voice recognition software.  Every effort was made to ensure accuracy; however, inadvertent computerized transcription errors may be present       Anne Jimenez MD  09/22/19 1891
08-Jul-2025 08:20

## 2025-07-10 ENCOUNTER — HOSPITAL ENCOUNTER (OUTPATIENT)
Age: 40
Setting detail: OBSERVATION
Discharge: HOME | End: 2025-07-11
Attending: EMERGENCY MEDICINE | Admitting: STUDENT IN AN ORGANIZED HEALTH CARE EDUCATION/TRAINING PROGRAM
Payer: COMMERCIAL

## 2025-07-10 ENCOUNTER — ANESTHESIA EVENT (OUTPATIENT)
Dept: OPERATING ROOM | Age: 40
End: 2025-07-10
Payer: COMMERCIAL

## 2025-07-10 ENCOUNTER — ANESTHESIA (OUTPATIENT)
Dept: OPERATING ROOM | Age: 40
End: 2025-07-10
Payer: COMMERCIAL

## 2025-07-10 ENCOUNTER — APPOINTMENT (OUTPATIENT)
Dept: GENERAL RADIOLOGY | Age: 40
End: 2025-07-10
Payer: COMMERCIAL

## 2025-07-10 DIAGNOSIS — L03.019 FELON OF FINGER: ICD-10-CM

## 2025-07-10 DIAGNOSIS — L03.011 FELON OF FINGER OF RIGHT HAND: ICD-10-CM

## 2025-07-10 DIAGNOSIS — L03.011 FELON OF FINGER OF RIGHT HAND: Primary | ICD-10-CM

## 2025-07-10 LAB
ALBUMIN SERPL-MCNC: 4.6 G/DL (ref 3.5–5.2)
ALP SERPL-CCNC: 106 U/L (ref 35–104)
ALT SERPL-CCNC: 104 U/L (ref 0–35)
ANION GAP SERPL CALCULATED.3IONS-SCNC: 13 MMOL/L (ref 7–16)
AST SERPL-CCNC: 48 U/L (ref 0–35)
BASOPHILS # BLD: 0.03 K/UL (ref 0–0.2)
BASOPHILS NFR BLD: 0 % (ref 0–2)
BILIRUB SERPL-MCNC: 0.2 MG/DL (ref 0–1.2)
BUN SERPL-MCNC: 18 MG/DL (ref 6–20)
CALCIUM SERPL-MCNC: 10.1 MG/DL (ref 8.6–10)
CHLORIDE SERPL-SCNC: 102 MMOL/L (ref 98–107)
CO2 SERPL-SCNC: 21 MMOL/L (ref 22–29)
CREAT SERPL-MCNC: 0.8 MG/DL (ref 0.5–1)
CRP SERPL HS-MCNC: 5.3 MG/L (ref 0–5)
EOSINOPHIL # BLD: 0.03 K/UL (ref 0.05–0.5)
EOSINOPHILS RELATIVE PERCENT: 0 % (ref 0–6)
ERYTHROCYTE [DISTWIDTH] IN BLOOD BY AUTOMATED COUNT: 12.4 % (ref 11.5–15)
ERYTHROCYTE [SEDIMENTATION RATE] IN BLOOD BY WESTERGREN METHOD: 37 MM/HR (ref 0–20)
GFR, ESTIMATED: >90 ML/MIN/1.73M2
GLUCOSE SERPL-MCNC: 97 MG/DL (ref 74–99)
HCT VFR BLD AUTO: 41.4 % (ref 34–48)
HGB BLD-MCNC: 13.7 G/DL (ref 11.5–15.5)
IMM GRANULOCYTES # BLD AUTO: 0.03 K/UL (ref 0–0.58)
IMM GRANULOCYTES NFR BLD: 0 % (ref 0–5)
INR PPP: 1
LACTATE BLDV-SCNC: 0.9 MMOL/L (ref 0.5–2.2)
LYMPHOCYTES NFR BLD: 3.61 K/UL (ref 1.5–4)
LYMPHOCYTES RELATIVE PERCENT: 47 % (ref 20–42)
MCH RBC QN AUTO: 29.9 PG (ref 26–35)
MCHC RBC AUTO-ENTMCNC: 33.1 G/DL (ref 32–34.5)
MCV RBC AUTO: 90.4 FL (ref 80–99.9)
MONOCYTES NFR BLD: 0.53 K/UL (ref 0.1–0.95)
MONOCYTES NFR BLD: 7 % (ref 2–12)
NEUTROPHILS NFR BLD: 45 % (ref 43–80)
NEUTS SEG NFR BLD: 3.49 K/UL (ref 1.8–7.3)
PARTIAL THROMBOPLASTIN TIME: 35.9 SEC (ref 24.5–35.1)
PLATELET # BLD AUTO: 368 K/UL (ref 130–450)
PMV BLD AUTO: 8.6 FL (ref 7–12)
POTASSIUM SERPL-SCNC: 4.3 MMOL/L (ref 3.5–5.1)
PROT SERPL-MCNC: 8.3 G/DL (ref 6.4–8.3)
PROTHROMBIN TIME: 11.2 SEC (ref 9.3–12.4)
RBC # BLD AUTO: 4.58 M/UL (ref 3.5–5.5)
SODIUM SERPL-SCNC: 137 MMOL/L (ref 136–145)
WBC OTHER # BLD: 7.7 K/UL (ref 4.5–11.5)

## 2025-07-10 PROCEDURE — 85610 PROTHROMBIN TIME: CPT

## 2025-07-10 PROCEDURE — 3600000002 HC SURGERY LEVEL 2 BASE: Performed by: STUDENT IN AN ORGANIZED HEALTH CARE EDUCATION/TRAINING PROGRAM

## 2025-07-10 PROCEDURE — 87205 SMEAR GRAM STAIN: CPT

## 2025-07-10 PROCEDURE — G0378 HOSPITAL OBSERVATION PER HR: HCPCS

## 2025-07-10 PROCEDURE — 86140 C-REACTIVE PROTEIN: CPT

## 2025-07-10 PROCEDURE — 6360000002 HC RX W HCPCS: Performed by: STUDENT IN AN ORGANIZED HEALTH CARE EDUCATION/TRAINING PROGRAM

## 2025-07-10 PROCEDURE — 2580000003 HC RX 258: Performed by: STUDENT IN AN ORGANIZED HEALTH CARE EDUCATION/TRAINING PROGRAM

## 2025-07-10 PROCEDURE — 99285 EMERGENCY DEPT VISIT HI MDM: CPT

## 2025-07-10 PROCEDURE — 85652 RBC SED RATE AUTOMATED: CPT

## 2025-07-10 PROCEDURE — 2709999900 HC NON-CHARGEABLE SUPPLY: Performed by: STUDENT IN AN ORGANIZED HEALTH CARE EDUCATION/TRAINING PROGRAM

## 2025-07-10 PROCEDURE — 6370000000 HC RX 637 (ALT 250 FOR IP): Performed by: STUDENT IN AN ORGANIZED HEALTH CARE EDUCATION/TRAINING PROGRAM

## 2025-07-10 PROCEDURE — 2580000003 HC RX 258: Performed by: NURSE ANESTHETIST, CERTIFIED REGISTERED

## 2025-07-10 PROCEDURE — 94640 AIRWAY INHALATION TREATMENT: CPT

## 2025-07-10 PROCEDURE — 6370000000 HC RX 637 (ALT 250 FOR IP): Performed by: PHYSICIAN ASSISTANT

## 2025-07-10 PROCEDURE — 87077 CULTURE AEROBIC IDENTIFY: CPT

## 2025-07-10 PROCEDURE — 3600000012 HC SURGERY LEVEL 2 ADDTL 15MIN: Performed by: STUDENT IN AN ORGANIZED HEALTH CARE EDUCATION/TRAINING PROGRAM

## 2025-07-10 PROCEDURE — 6360000002 HC RX W HCPCS: Performed by: NURSE ANESTHETIST, CERTIFIED REGISTERED

## 2025-07-10 PROCEDURE — 2580000003 HC RX 258: Performed by: PHYSICIAN ASSISTANT

## 2025-07-10 PROCEDURE — 80053 COMPREHEN METABOLIC PANEL: CPT

## 2025-07-10 PROCEDURE — 87040 BLOOD CULTURE FOR BACTERIA: CPT

## 2025-07-10 PROCEDURE — 87070 CULTURE OTHR SPECIMN AEROBIC: CPT

## 2025-07-10 PROCEDURE — 2500000003 HC RX 250 WO HCPCS: Performed by: STUDENT IN AN ORGANIZED HEALTH CARE EDUCATION/TRAINING PROGRAM

## 2025-07-10 PROCEDURE — 87015 SPECIMEN INFECT AGNT CONCNTJ: CPT

## 2025-07-10 PROCEDURE — 3700000000 HC ANESTHESIA ATTENDED CARE: Performed by: STUDENT IN AN ORGANIZED HEALTH CARE EDUCATION/TRAINING PROGRAM

## 2025-07-10 PROCEDURE — 85730 THROMBOPLASTIN TIME PARTIAL: CPT

## 2025-07-10 PROCEDURE — 87102 FUNGUS ISOLATION CULTURE: CPT

## 2025-07-10 PROCEDURE — 87075 CULTR BACTERIA EXCEPT BLOOD: CPT

## 2025-07-10 PROCEDURE — 87206 SMEAR FLUORESCENT/ACID STAI: CPT

## 2025-07-10 PROCEDURE — 73140 X-RAY EXAM OF FINGER(S): CPT

## 2025-07-10 PROCEDURE — 83605 ASSAY OF LACTIC ACID: CPT

## 2025-07-10 PROCEDURE — 7100000011 HC PHASE II RECOVERY - ADDTL 15 MIN: Performed by: STUDENT IN AN ORGANIZED HEALTH CARE EDUCATION/TRAINING PROGRAM

## 2025-07-10 PROCEDURE — 87116 MYCOBACTERIA CULTURE: CPT

## 2025-07-10 PROCEDURE — 3700000001 HC ADD 15 MINUTES (ANESTHESIA): Performed by: STUDENT IN AN ORGANIZED HEALTH CARE EDUCATION/TRAINING PROGRAM

## 2025-07-10 PROCEDURE — 7100000010 HC PHASE II RECOVERY - FIRST 15 MIN: Performed by: STUDENT IN AN ORGANIZED HEALTH CARE EDUCATION/TRAINING PROGRAM

## 2025-07-10 PROCEDURE — 85025 COMPLETE CBC W/AUTO DIFF WBC: CPT

## 2025-07-10 RX ORDER — LISINOPRIL AND HYDROCHLOROTHIAZIDE 20; 25 MG/1; MG/1
1 TABLET ORAL DAILY
Status: DISCONTINUED | OUTPATIENT
Start: 2025-07-10 | End: 2025-07-10 | Stop reason: CLARIF

## 2025-07-10 RX ORDER — 0.9 % SODIUM CHLORIDE 0.9 %
1000 INTRAVENOUS SOLUTION INTRAVENOUS ONCE
Status: COMPLETED | OUTPATIENT
Start: 2025-07-10 | End: 2025-07-10

## 2025-07-10 RX ORDER — OXYCODONE AND ACETAMINOPHEN 5; 325 MG/1; MG/1
1 TABLET ORAL ONCE
Refills: 0 | Status: COMPLETED | OUTPATIENT
Start: 2025-07-10 | End: 2025-07-10

## 2025-07-10 RX ORDER — ACETAMINOPHEN 325 MG/1
650 TABLET ORAL EVERY 6 HOURS
Status: DISCONTINUED | OUTPATIENT
Start: 2025-07-10 | End: 2025-07-11 | Stop reason: HOSPADM

## 2025-07-10 RX ORDER — OXYCODONE HYDROCHLORIDE 5 MG/1
5 TABLET ORAL
Status: DISCONTINUED | OUTPATIENT
Start: 2025-07-10 | End: 2025-07-10 | Stop reason: HOSPADM

## 2025-07-10 RX ORDER — MORPHINE SULFATE 2 MG/ML
2 INJECTION, SOLUTION INTRAMUSCULAR; INTRAVENOUS
Status: DISCONTINUED | OUTPATIENT
Start: 2025-07-10 | End: 2025-07-11 | Stop reason: HOSPADM

## 2025-07-10 RX ORDER — CLINDAMYCIN PHOSPHATE 600 MG/50ML
600 INJECTION, SOLUTION INTRAVENOUS EVERY 6 HOURS
Status: DISCONTINUED | OUTPATIENT
Start: 2025-07-10 | End: 2025-07-11 | Stop reason: HOSPADM

## 2025-07-10 RX ORDER — SODIUM CHLORIDE 0.9 % (FLUSH) 0.9 %
5-40 SYRINGE (ML) INJECTION PRN
Status: DISCONTINUED | OUTPATIENT
Start: 2025-07-10 | End: 2025-07-11 | Stop reason: HOSPADM

## 2025-07-10 RX ORDER — LIDOCAINE HYDROCHLORIDE AND EPINEPHRINE 10; 10 MG/ML; UG/ML
INJECTION, SOLUTION INFILTRATION; PERINEURAL PRN
Status: DISCONTINUED | OUTPATIENT
Start: 2025-07-10 | End: 2025-07-10 | Stop reason: ALTCHOICE

## 2025-07-10 RX ORDER — CETIRIZINE HYDROCHLORIDE 10 MG/1
5 TABLET ORAL DAILY PRN
COMMUNITY
Start: 2025-04-29

## 2025-07-10 RX ORDER — PROPOFOL 10 MG/ML
INJECTION, EMULSION INTRAVENOUS
Status: DISCONTINUED | OUTPATIENT
Start: 2025-07-10 | End: 2025-07-10 | Stop reason: SDUPTHER

## 2025-07-10 RX ORDER — SODIUM CHLORIDE 0.9 % (FLUSH) 0.9 %
5-40 SYRINGE (ML) INJECTION PRN
Status: DISCONTINUED | OUTPATIENT
Start: 2025-07-10 | End: 2025-07-10 | Stop reason: HOSPADM

## 2025-07-10 RX ORDER — HYDROCHLOROTHIAZIDE 25 MG/1
25 TABLET ORAL DAILY
Status: DISCONTINUED | OUTPATIENT
Start: 2025-07-10 | End: 2025-07-11 | Stop reason: HOSPADM

## 2025-07-10 RX ORDER — SODIUM CHLORIDE 0.9 % (FLUSH) 0.9 %
5-40 SYRINGE (ML) INJECTION EVERY 12 HOURS SCHEDULED
Status: DISCONTINUED | OUTPATIENT
Start: 2025-07-10 | End: 2025-07-10 | Stop reason: HOSPADM

## 2025-07-10 RX ORDER — FAMOTIDINE 20 MG/1
20 TABLET, FILM COATED ORAL 2 TIMES DAILY
Status: DISCONTINUED | OUTPATIENT
Start: 2025-07-10 | End: 2025-07-11 | Stop reason: HOSPADM

## 2025-07-10 RX ORDER — FENTANYL CITRATE 50 UG/ML
50 INJECTION, SOLUTION INTRAMUSCULAR; INTRAVENOUS EVERY 5 MIN PRN
Status: DISCONTINUED | OUTPATIENT
Start: 2025-07-10 | End: 2025-07-10 | Stop reason: HOSPADM

## 2025-07-10 RX ORDER — MIDAZOLAM HYDROCHLORIDE 1 MG/ML
INJECTION, SOLUTION INTRAMUSCULAR; INTRAVENOUS
Status: DISCONTINUED | OUTPATIENT
Start: 2025-07-10 | End: 2025-07-10 | Stop reason: SDUPTHER

## 2025-07-10 RX ORDER — SODIUM CHLORIDE 9 MG/ML
INJECTION, SOLUTION INTRAVENOUS PRN
Status: DISCONTINUED | OUTPATIENT
Start: 2025-07-10 | End: 2025-07-11 | Stop reason: HOSPADM

## 2025-07-10 RX ORDER — SODIUM CHLORIDE 9 MG/ML
INJECTION, SOLUTION INTRAVENOUS
Status: DISCONTINUED | OUTPATIENT
Start: 2025-07-10 | End: 2025-07-10 | Stop reason: SDUPTHER

## 2025-07-10 RX ORDER — ALBUTEROL SULFATE 90 UG/1
2 INHALANT RESPIRATORY (INHALATION) EVERY 6 HOURS PRN
Status: DISCONTINUED | OUTPATIENT
Start: 2025-07-10 | End: 2025-07-10 | Stop reason: CLARIF

## 2025-07-10 RX ORDER — ALBUTEROL SULFATE 0.83 MG/ML
2.5 SOLUTION RESPIRATORY (INHALATION) EVERY 6 HOURS PRN
Status: DISCONTINUED | OUTPATIENT
Start: 2025-07-10 | End: 2025-07-11 | Stop reason: HOSPADM

## 2025-07-10 RX ORDER — LISINOPRIL 20 MG/1
20 TABLET ORAL DAILY
Status: DISCONTINUED | OUTPATIENT
Start: 2025-07-10 | End: 2025-07-11 | Stop reason: HOSPADM

## 2025-07-10 RX ORDER — SODIUM CHLORIDE 9 MG/ML
INJECTION, SOLUTION INTRAVENOUS PRN
Status: DISCONTINUED | OUTPATIENT
Start: 2025-07-10 | End: 2025-07-10 | Stop reason: HOSPADM

## 2025-07-10 RX ORDER — FENTANYL CITRATE 50 UG/ML
INJECTION, SOLUTION INTRAMUSCULAR; INTRAVENOUS
Status: DISCONTINUED | OUTPATIENT
Start: 2025-07-10 | End: 2025-07-10 | Stop reason: SDUPTHER

## 2025-07-10 RX ORDER — SODIUM CHLORIDE 9 MG/ML
INJECTION, SOLUTION INTRAVENOUS CONTINUOUS
Status: DISCONTINUED | OUTPATIENT
Start: 2025-07-10 | End: 2025-07-11 | Stop reason: HOSPADM

## 2025-07-10 RX ORDER — PROCHLORPERAZINE EDISYLATE 5 MG/ML
5 INJECTION INTRAMUSCULAR; INTRAVENOUS
Status: DISCONTINUED | OUTPATIENT
Start: 2025-07-10 | End: 2025-07-10 | Stop reason: HOSPADM

## 2025-07-10 RX ORDER — SODIUM CHLORIDE 0.9 % (FLUSH) 0.9 %
5-40 SYRINGE (ML) INJECTION EVERY 12 HOURS SCHEDULED
Status: DISCONTINUED | OUTPATIENT
Start: 2025-07-10 | End: 2025-07-11 | Stop reason: HOSPADM

## 2025-07-10 RX ORDER — ONDANSETRON 2 MG/ML
4 INJECTION INTRAMUSCULAR; INTRAVENOUS EVERY 6 HOURS PRN
Status: DISCONTINUED | OUTPATIENT
Start: 2025-07-10 | End: 2025-07-11 | Stop reason: HOSPADM

## 2025-07-10 RX ADMIN — SODIUM CHLORIDE: 0.9 INJECTION, SOLUTION INTRAVENOUS at 21:41

## 2025-07-10 RX ADMIN — MIDAZOLAM 2 MG: 1 INJECTION INTRAMUSCULAR; INTRAVENOUS at 18:40

## 2025-07-10 RX ADMIN — PROPOFOL 100 MG: 10 INJECTION, EMULSION INTRAVENOUS at 18:49

## 2025-07-10 RX ADMIN — CLINDAMYCIN PHOSPHATE 600 MG: 600 INJECTION, SOLUTION INTRAVENOUS at 21:43

## 2025-07-10 RX ADMIN — SODIUM CHLORIDE, PRESERVATIVE FREE 10 ML: 5 INJECTION INTRAVENOUS at 21:40

## 2025-07-10 RX ADMIN — SODIUM CHLORIDE 1000 ML: 0.9 INJECTION, SOLUTION INTRAVENOUS at 13:43

## 2025-07-10 RX ADMIN — FENTANYL CITRATE 50 MCG: 50 INJECTION INTRAMUSCULAR; INTRAVENOUS at 19:32

## 2025-07-10 RX ADMIN — HYDROCHLOROTHIAZIDE 25 MG: 25 TABLET ORAL at 21:43

## 2025-07-10 RX ADMIN — PROPOFOL 150 MCG/KG/MIN: 10 INJECTION, EMULSION INTRAVENOUS at 18:51

## 2025-07-10 RX ADMIN — ONDANSETRON 4 MG: 2 INJECTION, SOLUTION INTRAMUSCULAR; INTRAVENOUS at 21:34

## 2025-07-10 RX ADMIN — FAMOTIDINE 20 MG: 20 TABLET, FILM COATED ORAL at 21:35

## 2025-07-10 RX ADMIN — SODIUM CHLORIDE: 9 INJECTION, SOLUTION INTRAVENOUS at 18:40

## 2025-07-10 RX ADMIN — LISINOPRIL 20 MG: 20 TABLET ORAL at 21:43

## 2025-07-10 RX ADMIN — CEFAZOLIN 2000 MG: 2 INJECTION, POWDER, FOR SOLUTION INTRAMUSCULAR; INTRAVENOUS at 18:40

## 2025-07-10 RX ADMIN — FENTANYL CITRATE 100 MCG: 50 INJECTION, SOLUTION INTRAMUSCULAR; INTRAVENOUS at 18:49

## 2025-07-10 RX ADMIN — ACETAMINOPHEN 650 MG: 325 TABLET ORAL at 21:34

## 2025-07-10 RX ADMIN — ARFORMOTEROL TARTRATE: 15 SOLUTION RESPIRATORY (INHALATION) at 20:27

## 2025-07-10 RX ADMIN — OXYCODONE AND ACETAMINOPHEN 1 TABLET: 5; 325 TABLET ORAL at 13:42

## 2025-07-10 ASSESSMENT — PAIN DESCRIPTION - ORIENTATION
ORIENTATION: LEFT
ORIENTATION: RIGHT
ORIENTATION: RIGHT
ORIENTATION: LEFT

## 2025-07-10 ASSESSMENT — PAIN DESCRIPTION - DESCRIPTORS
DESCRIPTORS: TINGLING
DESCRIPTORS: BURNING
DESCRIPTORS: THROBBING
DESCRIPTORS: BURNING
DESCRIPTORS: ACHING
DESCRIPTORS: BURNING;DISCOMFORT

## 2025-07-10 ASSESSMENT — PAIN SCALES - GENERAL
PAINLEVEL_OUTOF10: 4
PAINLEVEL_OUTOF10: 2
PAINLEVEL_OUTOF10: 2
PAINLEVEL_OUTOF10: 7
PAINLEVEL_OUTOF10: 2
PAINLEVEL_OUTOF10: 4
PAINLEVEL_OUTOF10: 4
PAINLEVEL_OUTOF10: 7

## 2025-07-10 ASSESSMENT — PAIN DESCRIPTION - LOCATION
LOCATION: HAND
LOCATION: HAND
LOCATION: FINGER (COMMENT WHICH ONE)
LOCATION: FINGER (COMMENT WHICH ONE)

## 2025-07-10 ASSESSMENT — LIFESTYLE VARIABLES
HOW MANY STANDARD DRINKS CONTAINING ALCOHOL DO YOU HAVE ON A TYPICAL DAY: 1 OR 2
HOW OFTEN DO YOU HAVE A DRINK CONTAINING ALCOHOL: MONTHLY OR LESS
SMOKING_STATUS: 0

## 2025-07-10 ASSESSMENT — PAIN DESCRIPTION - FREQUENCY
FREQUENCY: CONTINUOUS
FREQUENCY: CONTINUOUS

## 2025-07-10 ASSESSMENT — PAIN DESCRIPTION - PAIN TYPE: TYPE: SURGICAL PAIN

## 2025-07-10 NOTE — OP NOTE
Operative Note      Patient: Flori Dyer  YOB: 1985  MRN: [id number]    Date of Procedure: 7/10/2025    Pre-op diagnosis:  Right thumb felon and paronychia infection    Post-Op Diagnosis: Same       Procedure:  Right thumb irrigation debridement of paronychial fold and pulp    Surgeon(s):  Erik King DO    Assistant:   * No surgical staff found *    Anesthesia: Monitor Anesthesia Care    Estimated Blood Loss (mL): Minimal    Complications: None    Specimens:   ID Type Source Tests Collected by Time Destination   1 : RIGHT THUMB Tissue Tissue CULTURE, ANAEROBIC, CULTURE, FUNGUS, GRAM STAIN, CULTURE WITH SMEAR, ACID FAST BACILLIUS Erik King DO 7/10/2025 1854        Implants:  * No implants in log *      Drains: * No LDAs found *    Findings:  Present At Time Of Surgery (PATOS) (choose all levels that have infection present):  - Superficial Infection (skin/subcutaneous) present as evidenced by purulent fluid  Other Findings: Superficial infection on either the nail fold with necrotic tissue at the distal pulp    OPERATIVE INDICATION: The patient is a very pleasant patient with  persistent and recalcitrant trigger finger. After failing  conservative management, she wished to proceed with trigger finger release.  Risks, benefits, alternatives, and complications were fully explained to her  including, but not limited to, the risk of infection, damage to  nerves/vessels/tendons, failure to relieve her symptoms, worsening symptoms,  recurrence of symptoms, scar sensitivity as well as unforeseen complications. She voiced her understanding and wished to proceed.    PROCEDURE IN DETAIL: The patient was identified in the holding area. The  Right hand was identified as the operative site. She was then seen by  Anesthesia, taken to the operating room, placed supine on the table, and  underwent monitored anesthesia care per Anesthesia Department. Under sterile  conditions, approximately 9 mL of 1:1

## 2025-07-10 NOTE — ED PROVIDER NOTES
Shared [facility]-ED Attending Visit.  CC: No               [facility] EMERGENCY DEPARTMENT  ED  Encounter Note  Admit Date/RoomTime: 7/10/2025  1:09 PM  ED Room: WAITING RESULTS/WAITING *  NAME: Flori Dyer  : 1985  MRN: [id number]  PCP: Aleixs Bazzi MD    CHIEF COMPLAINT     Wound Infection    HISTORY OF PRESENT ILLNESS        Flori Dyer is a 40 y.o. female who presents to the ED by private vehicle for painful and swollen right thumb beginning a few week(s) ago. The complaint has been persistent and are moderate in severity.  The patient was here today with an infection in the right thumb.  She has been seen multiple times in the past few weeks.  This started after she closed the car door on the thumb at the end of .  The patient developed an infection around the nailbed and in the palmar surface of the thumb.  She was initially on Keflex and then when that failed to treat the infection Keflex was added.  She did have an I&D here in the emergency room recently.  The patient was referred to Dr. Erik King.  She actually saw him yesterday in the office.  He stated that if things got worse she would need to come to the hospital.  She states that the drainage stopped yesterday and there is now more swelling and pain at the site.  She reports decreased range of motion, some chills and even an episode of vomiting this morning.  When she called the hand surgeon this morning she was advised to come to the emergency room for IV abs and admission/surgery.        REVIEW OF SYSTEMS     Pertinent positives and negatives are stated within HPI, all other systems reviewed and are negative.    Past Medical History:  has a past medical history of Abnormal Pap smear, Back pain, Bronchitis, Chronic back pain, Endometriosis, H/O colposcopy with cervical biopsy, Headache, Hypertension, Placenta previa, Pregnancy induced hypertension, and Thyroid disease.  Surgical History:  has a past surgical

## 2025-07-10 NOTE — ANESTHESIA PRE PROCEDURE
Department of Anesthesiology  Preprocedure Note       Name:  Flori Dyer   Age:  40 y.o.  :  1985                                          MRN:  [id number]         Date:  7/10/2025      Surgeon: Surgeon(s):  Erik King DO    Procedure: Procedure(s):  right thumb incision and drainage    Medications prior to admission:   Prior to Admission medications    Medication Sig Start Date End Date Taking? Authorizing Provider   cetirizine (ZYRTEC) 10 MG tablet Take 0.5 tablets by mouth daily as needed 25  Yes ProviderSharath MD   famotidine (PEPCID) 20 MG tablet Take 1 tablet by mouth 2 times daily 25  Yes Chapin Richard MD   lisinopril-hydroCHLOROthiazide (PRINZIDE;ZESTORETIC) 20-25 MG per tablet Take 1 tablet by mouth daily   Yes ProviderSharath MD   budesonide-formoterol (SYMBICORT) 80-4.5 MCG/ACT AERO Inhale 2 puffs into the lungs in the morning and 2 puffs in the evening.   Yes ProviderSharath MD   albuterol sulfate HFA (VENTOLIN HFA) 108 (90 Base) MCG/ACT inhaler Inhale 2 puffs into the lungs every 6 hours as needed for Wheezing   Yes Provider, MD Sharath   acetaminophen (TYLENOL) 500 MG tablet Take 1 tablet by mouth 4 times daily as needed for Pain 3/22/23  Yes Anuja Parkinson APRN - CNP   fluticasone (FLONASE) 50 MCG/ACT nasal spray 2 sprays by Each Nostril route daily 21  Yes Jose Judd DO   sulfamethoxazole-trimethoprim (BACTRIM DS;SEPTRA DS) 800-160 MG per tablet Take 1 tablet by mouth 2 times daily for 10 days 25  Rafal Alcala MD   cephALEXin (KEFLEX) 500 MG capsule Take 1 capsule by mouth 4 times daily for 10 days  Patient not taking: Reported on 7/10/2025 7/1/25 7/11/25  Nory Hickey PA       Current medications:    Current Facility-Administered Medications   Medication Dose Route Frequency Provider Last Rate Last Admin    ceFAZolin (ANCEF) 2,000 mg in sterile water 20 mL IV syringe  2,000 mg IntraVENous On Call to OR Fernando

## 2025-07-10 NOTE — H&P
Department of Orthopedic Surgery   H&P Consult Note            HISTORY OF PRESENT ILLNESS:       Patient is a 40 y.o. female who presents increasing pain to her right thumb.  Patient is having increasing drainage.  Patient's failed her previous ER I&D and oral antibiotics.  Patient having increasing pain to the region.  No fever or chills.  Patient is right-hand dominant.  Patient was seen in the office 3 days ago.  Patient has been doing soaks.    Past Medical History:        Diagnosis Date    Abnormal Pap smear     Back pain     Bronchitis     Chronic back pain     Endometriosis     H/O colposcopy with cervical biopsy     Headache     Hypertension     Placenta previa     Current pregnancy    Pregnancy induced hypertension     Thyroid disease     warm nodule & goiter     Past Surgical History:        Procedure Laterality Date    HERNIA REPAIR      HYSTERECTOMY (CERVIX STATUS UNKNOWN)  2013    OVARY REMOVAL      TUBAL LIGATION  2013    WISDOM TOOTH EXTRACTION       Current Medications:   No current facility-administered medications for this encounter.  Allergies:  Aspirin and Nsaids    Social History:   TOBACCO:   reports that she quit smoking about 9 years ago. Her smoking use included cigarettes. She has never used smokeless tobacco.  ETOH:   reports no history of alcohol use.  DRUGS:   reports current drug use. Drug: Marijuana (Weed).  ACTIVITIES OF DAILY LIVING:    OCCUPATION:    Family History:       Problem Relation Age of Onset    High Blood Pressure Mother        REVIEW OF SYSTEMS:  CONSTITUTIONAL:  negative for  fevers, chills  EYES:  negative for blurred vision, visual disturbance  HEENT:  negative for  hearing loss, voice change  RESPIRATORY:  negative for  dyspnea, wheezing  CARDIOVASCULAR:  negative for  chest pain, palpitations  GASTROINTESTINAL:  negative for nausea, vomiting  GENITOURINARY:  negative for frequency, urinary incontinence  HEMATOLOGIC/LYMPHATIC:  negative for bleeding and

## 2025-07-10 NOTE — PROGRESS NOTES
Database initiated pharmacy and medications verified with the patient. She is A&O comes in from home with significant other and kids. Right thumb swollen and painful. She is having diarrhea.

## 2025-07-11 VITALS
HEART RATE: 79 BPM | RESPIRATION RATE: 16 BRPM | SYSTOLIC BLOOD PRESSURE: 134 MMHG | BODY MASS INDEX: 33.08 KG/M2 | TEMPERATURE: 97.9 F | WEIGHT: 163.8 LBS | OXYGEN SATURATION: 99 % | DIASTOLIC BLOOD PRESSURE: 79 MMHG

## 2025-07-11 LAB
ANION GAP SERPL CALCULATED.3IONS-SCNC: 12 MMOL/L (ref 7–16)
BUN SERPL-MCNC: 14 MG/DL (ref 6–20)
CALCIUM SERPL-MCNC: 9.1 MG/DL (ref 8.6–10)
CHLORIDE SERPL-SCNC: 106 MMOL/L (ref 98–107)
CO2 SERPL-SCNC: 20 MMOL/L (ref 22–29)
CREAT SERPL-MCNC: 0.8 MG/DL (ref 0.5–1)
ERYTHROCYTE [DISTWIDTH] IN BLOOD BY AUTOMATED COUNT: 12.4 % (ref 11.5–15)
GFR, ESTIMATED: >90 ML/MIN/1.73M2
GLUCOSE SERPL-MCNC: 95 MG/DL (ref 74–99)
HCT VFR BLD AUTO: 34.9 % (ref 34–48)
HGB BLD-MCNC: 11.4 G/DL (ref 11.5–15.5)
MCH RBC QN AUTO: 29.8 PG (ref 26–35)
MCHC RBC AUTO-ENTMCNC: 32.7 G/DL (ref 32–34.5)
MCV RBC AUTO: 91.4 FL (ref 80–99.9)
PLATELET # BLD AUTO: 316 K/UL (ref 130–450)
PMV BLD AUTO: 8.4 FL (ref 7–12)
POTASSIUM SERPL-SCNC: 3.8 MMOL/L (ref 3.5–5.1)
RBC # BLD AUTO: 3.82 M/UL (ref 3.5–5.5)
SODIUM SERPL-SCNC: 138 MMOL/L (ref 136–145)
WBC OTHER # BLD: 7.3 K/UL (ref 4.5–11.5)

## 2025-07-11 PROCEDURE — 96375 TX/PRO/DX INJ NEW DRUG ADDON: CPT

## 2025-07-11 PROCEDURE — G0378 HOSPITAL OBSERVATION PER HR: HCPCS

## 2025-07-11 PROCEDURE — 94640 AIRWAY INHALATION TREATMENT: CPT

## 2025-07-11 PROCEDURE — 2580000003 HC RX 258: Performed by: STUDENT IN AN ORGANIZED HEALTH CARE EDUCATION/TRAINING PROGRAM

## 2025-07-11 PROCEDURE — 6370000000 HC RX 637 (ALT 250 FOR IP): Performed by: STUDENT IN AN ORGANIZED HEALTH CARE EDUCATION/TRAINING PROGRAM

## 2025-07-11 PROCEDURE — 96366 THER/PROPH/DIAG IV INF ADDON: CPT

## 2025-07-11 PROCEDURE — 96376 TX/PRO/DX INJ SAME DRUG ADON: CPT

## 2025-07-11 PROCEDURE — 6360000002 HC RX W HCPCS: Performed by: STUDENT IN AN ORGANIZED HEALTH CARE EDUCATION/TRAINING PROGRAM

## 2025-07-11 PROCEDURE — 96365 THER/PROPH/DIAG IV INF INIT: CPT

## 2025-07-11 PROCEDURE — 85027 COMPLETE CBC AUTOMATED: CPT

## 2025-07-11 PROCEDURE — 80048 BASIC METABOLIC PNL TOTAL CA: CPT

## 2025-07-11 RX ORDER — OXYCODONE HYDROCHLORIDE 5 MG/1
5 TABLET ORAL EVERY 6 HOURS PRN
Qty: 20 TABLET | Refills: 0 | Status: SHIPPED | OUTPATIENT
Start: 2025-07-11 | End: 2025-07-16

## 2025-07-11 RX ORDER — DOXYCYCLINE 100 MG/1
100 CAPSULE ORAL 2 TIMES DAILY
Qty: 28 CAPSULE | Refills: 0 | Status: SHIPPED | OUTPATIENT
Start: 2025-07-11 | End: 2025-07-25

## 2025-07-11 RX ORDER — DOXYCYCLINE 100 MG/1
100 CAPSULE ORAL 2 TIMES DAILY
Qty: 28 CAPSULE | Refills: 0 | Status: SHIPPED | OUTPATIENT
Start: 2025-07-11 | End: 2025-07-11

## 2025-07-11 RX ADMIN — ARFORMOTEROL TARTRATE: 15 SOLUTION RESPIRATORY (INHALATION) at 08:42

## 2025-07-11 RX ADMIN — CLINDAMYCIN PHOSPHATE 600 MG: 600 INJECTION, SOLUTION INTRAVENOUS at 02:49

## 2025-07-11 RX ADMIN — CLINDAMYCIN PHOSPHATE 600 MG: 600 INJECTION, SOLUTION INTRAVENOUS at 08:17

## 2025-07-11 RX ADMIN — MORPHINE SULFATE 2 MG: 2 INJECTION, SOLUTION INTRAMUSCULAR; INTRAVENOUS at 08:12

## 2025-07-11 RX ADMIN — LISINOPRIL 20 MG: 20 TABLET ORAL at 08:13

## 2025-07-11 RX ADMIN — ACETAMINOPHEN 650 MG: 325 TABLET ORAL at 02:47

## 2025-07-11 RX ADMIN — MORPHINE SULFATE 2 MG: 2 INJECTION, SOLUTION INTRAMUSCULAR; INTRAVENOUS at 00:56

## 2025-07-11 RX ADMIN — FAMOTIDINE 20 MG: 20 TABLET, FILM COATED ORAL at 08:13

## 2025-07-11 RX ADMIN — SODIUM CHLORIDE: 0.9 INJECTION, SOLUTION INTRAVENOUS at 01:45

## 2025-07-11 RX ADMIN — HYDROCHLOROTHIAZIDE 25 MG: 25 TABLET ORAL at 08:13

## 2025-07-11 RX ADMIN — MORPHINE SULFATE 2 MG: 2 INJECTION, SOLUTION INTRAMUSCULAR; INTRAVENOUS at 04:09

## 2025-07-11 RX ADMIN — ACETAMINOPHEN 650 MG: 325 TABLET ORAL at 08:13

## 2025-07-11 ASSESSMENT — PAIN DESCRIPTION - DESCRIPTORS
DESCRIPTORS: ACHING;BURNING;TINGLING
DESCRIPTORS: ACHING;DISCOMFORT;TINGLING
DESCRIPTORS: THROBBING
DESCRIPTORS: ACHING;BURNING;TINGLING

## 2025-07-11 ASSESSMENT — PAIN SCALES - GENERAL
PAINLEVEL_OUTOF10: 7
PAINLEVEL_OUTOF10: 6
PAINLEVEL_OUTOF10: 10
PAINLEVEL_OUTOF10: 8
PAINLEVEL_OUTOF10: 7
PAINLEVEL_OUTOF10: 8

## 2025-07-11 ASSESSMENT — PAIN DESCRIPTION - ORIENTATION
ORIENTATION: RIGHT

## 2025-07-11 ASSESSMENT — PAIN DESCRIPTION - LOCATION
LOCATION: FINGER (COMMENT WHICH ONE)
LOCATION: HAND
LOCATION: FINGER (COMMENT WHICH ONE)
LOCATION: FINGER (COMMENT WHICH ONE)

## 2025-07-11 NOTE — PROGRESS NOTES
Department of Orthopedic Surgery  Progress Note    Patient seen and examined. Pain controlled. No new complaints.  Denies chest pain, shortness of breath, dizziness/lightheadedness.     VITALS:  /79   Pulse 79   Temp 97.9 °F (36.6 °C) (Oral)   Resp 16   Wt 74.3 kg (163 lb 12.8 oz)   LMP 05/11/2013   SpO2 99%   BMI 33.08 kg/m²     General: alert and oriented to person, place and time, well-developed and well-nourished, in no acute distress    MUSCULOSKELETAL:   right upper extremity:  Dressing C/D/I  Compartments soft and compressible  +AIN/PIN/Ulnar/Median/Radial nerve function intact grossly  +2/4 Radial pulse, Cap refill <2 sec  Sensation intact to touch in radial/ulnar/median nerve distributions to hand    CBC:   Lab Results   Component Value Date/Time    WBC 7.3 07/11/2025 01:55 AM    HGB 11.4 07/11/2025 01:55 AM    HCT 34.9 07/11/2025 01:55 AM     07/11/2025 01:55 AM     PT/INR:    Lab Results   Component Value Date/Time    PROTIME 11.2 07/10/2025 01:27 PM    INR 1.0 07/10/2025 01:27 PM       Intraop Cultures: growning gram positive cocci    ASSESSMENT  S/P right thumb I&D    PLAN      Continue physical therapy and protocol: WBAT -right UE  Abx coverage  Deep venous thrombosis prophylaxis - early mobilization  Pain Control: IV and PO  Patient will be discharged today, will follow cultures, change antibiotics to doxycycline for discharge, patient was started twice daily soaks to the thumb for 1 week.  Will see patient back in 1 week for wound check  D/C Plan: Okay to discharge today    Electronically signed by Erik King DO on 7/11/25 at 7:16 AM EDT

## 2025-07-11 NOTE — PROGRESS NOTES
Discharge instructions reviewed with patient. Iv has been removed. Will transport home via private vehicle with family.

## 2025-07-11 NOTE — PROGRESS NOTES
4 Eyes Skin Assessment     NAME:  Flori Dyer  YOB: 1985  MEDICAL RECORD NUMBER:  [id number]    The patient is being assessed for  Admission    I agree that at least one RN has performed a thorough Head to Toe Skin Assessment on the patient. ALL assessment sites listed below have been assessed.      Areas assessed by both nurses:    Head, Face, Ears, Shoulders, Back, Chest, Arms, Elbows, Hands, Sacrum. Buttock, Coccyx, Ischium, Legs. Feet and Heels, and Under Medical Devices         Does the Patient have a Wound? No noted wound(s), surgical incision R thumb        Kolby Prevention initiated by RN: No  Wound Care Orders initiated by RN: No    Pressure Injury (Stage 1,2,3,4, Unstageable, DTI, NWPT, and Complex wounds) if present, place Wound referral order by RN under : No    New Ostomies, if present place, Ostomy referral order under : No     Nurse 1 eSignature: Electronically signed by Gabi William RN on 7/10/25 at 11:35 PM EDT    **SHARE this note so that the co-signing nurse can place an eSignature**    Nurse 2 eSignature: Electronically signed by Manjit Louie RN on 7/11/25 at 12:01 AM EDT

## 2025-07-11 NOTE — ANESTHESIA POSTPROCEDURE EVALUATION
Department of Anesthesiology  Postprocedure Note    Patient: Flori Dyer  MRN: [id number]  YOB: 1985  Date of evaluation: 7/11/2025    Procedure Summary       Date: 07/10/25 Room / Location: [address]    Anesthesia Start: 1840 Anesthesia Stop: 1911    Procedure: right thumb incision and drainage (Right) Diagnosis:       Felon of finger      (Felon of finger [L03.019])    Surgeons: Erik King DO Responsible Provider: Adilia Beasley DO    Anesthesia Type: MAC ASA Status: 2            Anesthesia Type: No value filed.    Mynor Phase I: Mynor Score: 10    Mynor Phase II: Mynor Score: 10    Anesthesia Post Evaluation    Patient location during evaluation: PACU  Patient participation: complete - patient participated  Level of consciousness: awake and alert  Nausea & Vomiting: no vomiting and no nausea  Cardiovascular status: hemodynamically stable  Respiratory status: acceptable and spontaneous ventilation  Hydration status: stable  Pain management: adequate    No notable events documented.

## 2025-07-11 NOTE — DISCHARGE INSTRUCTIONS
[facility]  [address] , [address]  [place] [address]    Or    [address] [place] [address]    Dr. Erik King, DO    Orthopaedics Discharge Instructions   Weight bearing Status - Weight bearing as tolerated - on right upper Extremity  Pain medication Per Prescriptions  Contact Office for Medication Refill-  [phone]  Office can refill pain med every 7 days  If patient discharging to facility then pain control will be continued per facility physician  Ice to operative/injured site for 15-30 minutes of each hour for next 5 days    Recommend that you continue to ice the area 2-3 times per day after this   Elevate operative/injured limb on 2 pillows at home  Goal is to have limb above the heart if able  Wound care -patient is okay to remove dressing on 7/12/2025, patient may start twice daily soaks of peroxide and warm tap water to the right thumb, patient may place a dry dressing over the thumb with Neosporin/bacitracin.  Follow Up in Office in 10-14 days. Call Office to Confirm Appointment time and Location - [phone]    Call the office at [phone] for directions or with any questions.  Watch for these significant complications.  Call physician if they or any other problems occur:  Fever over 101°, redness, swelling or warmth at the operative site  Unrelieved nausea    Foul smelling or cloudy drainage at the operative site   Unrelieved pain    Blood soaked dressing. (Some oozing may be normal)     Numb, pale, blue, cold or tingling extremity

## 2025-07-11 NOTE — CARE COORDINATION
Social Work discharge planning  Pt s/p right thumb I&D yesterday.  Pt reports independence with adls and ambulation. Support from family and boyfriend. Pt has a PCP.   If transport assist needed, pt can utilize her insurance benefit by calling [phone]. Discharge planning needs not identified.  Electronically signed by SHAUNA Perdomo on 7/11/2025 at 9:42 AM

## 2025-07-13 LAB
MICROORGANISM SPEC CULT: ABNORMAL
MICROORGANISM SPEC CULT: NORMAL
MICROORGANISM/AGENT SPEC: ABNORMAL
SERVICE CMNT-IMP: NORMAL
SPECIMEN DESCRIPTION: ABNORMAL
SPECIMEN DESCRIPTION: NORMAL

## 2025-07-14 LAB
MICROORGANISM SPEC CULT: ABNORMAL
MICROORGANISM/AGENT SPEC: ABNORMAL
SPECIMEN DESCRIPTION: ABNORMAL

## 2025-07-14 NOTE — DISCHARGE SUMMARY
Physician Discharge Summary     Patient ID:  Flori Dyer  [id number]  40 y.o.  1985    Admit date: 7/10/2025    Discharge date and time: 7/11/2025 10:54 AM     Admitting Physician: Erik King DO     Discharge Physician: Erik King DO    Admission Diagnoses: Felon of finger of right hand [L03.011]    Discharge Diagnoses: s/p right Thumb I&D    Admission Condition: good    Discharged Condition: good    Hospital Course: The patient was admitted from the Emergency room. After examination, review of radiologic studies, and appropriate pre-operative risk assessment, it was recommended by Erik King DO to undergo right thumb I&D.  Patient was taken to the PACU safely.  Patient was admitted for IV antibiotics.  Patient was discharged home the next day on oral antibiotics and soaks.        Treatments: s/p right thumb I&D    Disposition: Patient was discharge to home in stable condition. The patient was provided instructions to continue DVT prophylaxis as instructed, pain medication as prescribed, and to continue daily dry sterile dressing changes until wound is dry. The patient was to follow up with Erik King DO in 1 weeks, and to call the office for an appointment. suture (s) were to be removed on within 2 weeks.       Medication List        START taking these medications      doxycycline hyclate 100 MG capsule  Commonly known as: VIBRAMYCIN  Take 1 capsule by mouth 2 times daily for 14 days     oxyCODONE 5 MG immediate release tablet  Commonly known as: Roxicodone  Take 1 tablet by mouth every 6 hours as needed for Pain for up to 5 days. Intended supply: 5 days. Take lowest dose possible to manage pain Max Daily Amount: 20 mg            CONTINUE taking these medications      acetaminophen 500 MG tablet  Commonly known as: TYLENOL  Take 1 tablet by mouth 4 times daily as needed for Pain     cetirizine 10 MG tablet  Commonly known as: ZYRTEC     famotidine 20 MG tablet  Commonly known as: Pepcid  Take

## 2025-07-15 LAB
MICROORGANISM SPEC CULT: NORMAL
MICROORGANISM SPEC CULT: NORMAL
SEND OUT REPORT: NORMAL
SERVICE CMNT-IMP: NORMAL
SERVICE CMNT-IMP: NORMAL
SPECIMEN DESCRIPTION: NORMAL
SPECIMEN DESCRIPTION: NORMAL
TEST NAME: NORMAL

## 2025-07-16 LAB
MICROORGANISM SPEC CULT: NORMAL
MICROORGANISM SPEC CULT: NORMAL
MICROORGANISM/AGENT SPEC: NORMAL
MICROORGANISM/AGENT SPEC: NORMAL
SERVICE CMNT-IMP: NORMAL
SERVICE CMNT-IMP: NORMAL
SPECIMEN DESCRIPTION: NORMAL
SPECIMEN DESCRIPTION: NORMAL

## 2025-07-21 LAB
MICROORGANISM SPEC CULT: NORMAL
MICROORGANISM/AGENT SPEC: NORMAL
SEND OUT REPORT: NORMAL
SERVICE CMNT-IMP: NORMAL
SPECIMEN DESCRIPTION: NORMAL
TEST NAME: NORMAL

## 2025-07-22 ENCOUNTER — TELEPHONE (OUTPATIENT)
Dept: OCCUPATIONAL THERAPY | Age: 40
End: 2025-07-22

## 2025-07-27 LAB
MICROORGANISM SPEC CULT: NORMAL
MICROORGANISM/AGENT SPEC: NORMAL
SERVICE CMNT-IMP: NORMAL
SPECIMEN DESCRIPTION: NORMAL

## 2025-08-04 ENCOUNTER — HOSPITAL ENCOUNTER (OUTPATIENT)
Dept: OCCUPATIONAL THERAPY | Age: 40
Setting detail: THERAPIES SERIES
Discharge: HOME | End: 2025-08-04
Payer: COMMERCIAL

## 2025-08-04 PROCEDURE — 97165 OT EVAL LOW COMPLEX 30 MIN: CPT

## 2025-08-07 ENCOUNTER — HOSPITAL ENCOUNTER (OUTPATIENT)
Dept: OCCUPATIONAL THERAPY | Age: 40
Setting detail: THERAPIES SERIES
Discharge: HOME | End: 2025-08-07
Payer: COMMERCIAL

## 2025-08-07 PROCEDURE — 97760 ORTHOTIC MGMT&TRAING 1ST ENC: CPT

## 2025-08-07 PROCEDURE — 97140 MANUAL THERAPY 1/> REGIONS: CPT

## 2025-08-07 PROCEDURE — 97110 THERAPEUTIC EXERCISES: CPT

## 2025-08-11 ENCOUNTER — HOSPITAL ENCOUNTER (OUTPATIENT)
Dept: OCCUPATIONAL THERAPY | Age: 40
Setting detail: THERAPIES SERIES
Discharge: HOME | End: 2025-08-11
Payer: COMMERCIAL

## 2025-08-11 PROCEDURE — 97110 THERAPEUTIC EXERCISES: CPT | Performed by: OCCUPATIONAL THERAPIST

## 2025-08-11 PROCEDURE — 97530 THERAPEUTIC ACTIVITIES: CPT | Performed by: OCCUPATIONAL THERAPIST

## 2025-08-11 PROCEDURE — 97140 MANUAL THERAPY 1/> REGIONS: CPT | Performed by: OCCUPATIONAL THERAPIST

## 2025-08-13 ENCOUNTER — HOSPITAL ENCOUNTER (OUTPATIENT)
Dept: OCCUPATIONAL THERAPY | Age: 40
Setting detail: THERAPIES SERIES
Discharge: HOME | End: 2025-08-13
Payer: COMMERCIAL

## 2025-08-13 PROCEDURE — 97110 THERAPEUTIC EXERCISES: CPT | Performed by: OCCUPATIONAL THERAPIST

## 2025-08-13 PROCEDURE — 97018 PARAFFIN BATH THERAPY: CPT | Performed by: OCCUPATIONAL THERAPIST

## 2025-08-13 PROCEDURE — 97140 MANUAL THERAPY 1/> REGIONS: CPT | Performed by: OCCUPATIONAL THERAPIST

## 2025-08-18 ENCOUNTER — HOSPITAL ENCOUNTER (OUTPATIENT)
Dept: OCCUPATIONAL THERAPY | Age: 40
Setting detail: THERAPIES SERIES
Discharge: HOME | End: 2025-08-18
Payer: COMMERCIAL

## 2025-08-20 ENCOUNTER — HOSPITAL ENCOUNTER (OUTPATIENT)
Dept: OCCUPATIONAL THERAPY | Age: 40
Setting detail: THERAPIES SERIES
Discharge: HOME | End: 2025-08-20
Payer: COMMERCIAL

## 2025-08-20 PROCEDURE — 97022 WHIRLPOOL THERAPY: CPT | Performed by: OCCUPATIONAL THERAPIST

## 2025-08-20 PROCEDURE — 97110 THERAPEUTIC EXERCISES: CPT | Performed by: OCCUPATIONAL THERAPIST

## 2025-08-20 PROCEDURE — 97140 MANUAL THERAPY 1/> REGIONS: CPT | Performed by: OCCUPATIONAL THERAPIST

## 2025-08-20 PROCEDURE — 97530 THERAPEUTIC ACTIVITIES: CPT | Performed by: OCCUPATIONAL THERAPIST

## 2025-08-25 ENCOUNTER — HOSPITAL ENCOUNTER (OUTPATIENT)
Dept: OCCUPATIONAL THERAPY | Age: 40
Setting detail: THERAPIES SERIES
Discharge: HOME | End: 2025-08-25
Payer: COMMERCIAL

## 2025-08-25 PROCEDURE — 97140 MANUAL THERAPY 1/> REGIONS: CPT

## 2025-08-25 PROCEDURE — 97530 THERAPEUTIC ACTIVITIES: CPT

## 2025-08-25 PROCEDURE — 97022 WHIRLPOOL THERAPY: CPT

## 2025-08-25 PROCEDURE — 97110 THERAPEUTIC EXERCISES: CPT

## 2025-08-27 ENCOUNTER — HOSPITAL ENCOUNTER (OUTPATIENT)
Dept: OCCUPATIONAL THERAPY | Age: 40
Setting detail: THERAPIES SERIES
Discharge: HOME | End: 2025-08-27
Payer: COMMERCIAL

## 2025-09-03 ENCOUNTER — HOSPITAL ENCOUNTER (OUTPATIENT)
Dept: OCCUPATIONAL THERAPY | Age: 40
Setting detail: THERAPIES SERIES
Discharge: HOME | End: 2025-09-03
Payer: COMMERCIAL

## 2025-09-03 PROCEDURE — 97140 MANUAL THERAPY 1/> REGIONS: CPT | Performed by: OCCUPATIONAL THERAPIST

## 2025-09-03 PROCEDURE — 97022 WHIRLPOOL THERAPY: CPT | Performed by: OCCUPATIONAL THERAPIST

## 2025-09-03 PROCEDURE — 97110 THERAPEUTIC EXERCISES: CPT | Performed by: OCCUPATIONAL THERAPIST

## (undated) DEVICE — BLADE,STAINLESS-STEEL,15,STRL,DISPOSABLE: Brand: MEDLINE

## (undated) DEVICE — SWAB SPEC COLL SHFT L5.25IN POLYUR FOAM TIP SFT DBL MEDIA

## (undated) DEVICE — GLOVE ORANGE PI 8   MSG9080

## (undated) DEVICE — GLOVE ORANGE PI 7 1/2   MSG9075

## (undated) DEVICE — WIPES SKIN CLOTH READYPREP 9 X 10.5 IN 2% CHLORHEX GLUCONATE CHG PREOP

## (undated) DEVICE — DOUBLE BASIN SET: Brand: MEDLINE INDUSTRIES, INC.

## (undated) DEVICE — BANDAGE COMPR COHESIVE 5 YDX2 IN SELF ADH TAN COFLX LTX

## (undated) DEVICE — Device

## (undated) DEVICE — SOLUTION IRRIG 1000ML 0.9% SOD CHL USP POUR PLAS BTL

## (undated) DEVICE — PADDING,UNDERCAST,COTTON, 4"X4YD STERILE: Brand: MEDLINE

## (undated) DEVICE — 3M™ COBAN™ NL STERILE NON-LATEX SELF-ADHERENT WRAP, 2084S, 4 IN X 5 YD (10 CM X 4,5 M), 18 ROLLS/CASE: Brand: 3M™ COBAN™

## (undated) DEVICE — 4-PORT MANIFOLD: Brand: NEPTUNE 2